# Patient Record
Sex: FEMALE | Race: BLACK OR AFRICAN AMERICAN | NOT HISPANIC OR LATINO | Employment: FULL TIME | RURAL
[De-identification: names, ages, dates, MRNs, and addresses within clinical notes are randomized per-mention and may not be internally consistent; named-entity substitution may affect disease eponyms.]

---

## 2017-12-08 ENCOUNTER — HISTORICAL (OUTPATIENT)
Dept: ADMINISTRATIVE | Facility: HOSPITAL | Age: 47
End: 2017-12-08

## 2017-12-14 LAB
LAB AP CLINICAL INFORMATION: NORMAL
LAB AP COMMENTS: NORMAL
LAB AP GENERAL CAT - HISTORICAL: NORMAL
LAB AP INTERPRETATION/RESULT - HISTORICAL: NEGATIVE
LAB AP SPECIMEN ADEQUACY - HISTORICAL: NORMAL
LAB AP SPECIMEN SUBMITTED - HISTORICAL: NORMAL

## 2021-03-09 ENCOUNTER — HISTORICAL (OUTPATIENT)
Dept: ADMINISTRATIVE | Facility: HOSPITAL | Age: 51
End: 2021-03-09

## 2021-08-09 ENCOUNTER — OFFICE VISIT (OUTPATIENT)
Dept: FAMILY MEDICINE | Facility: CLINIC | Age: 51
End: 2021-08-09
Payer: COMMERCIAL

## 2021-08-09 VITALS
HEIGHT: 67 IN | OXYGEN SATURATION: 99 % | BODY MASS INDEX: 24.45 KG/M2 | DIASTOLIC BLOOD PRESSURE: 68 MMHG | TEMPERATURE: 98 F | WEIGHT: 155.81 LBS | SYSTOLIC BLOOD PRESSURE: 113 MMHG | HEART RATE: 84 BPM

## 2021-08-09 DIAGNOSIS — M25.512 ACUTE PAIN OF LEFT SHOULDER: ICD-10-CM

## 2021-08-09 DIAGNOSIS — M79.671 FOOT PAIN, RIGHT: ICD-10-CM

## 2021-08-09 DIAGNOSIS — E03.9 HYPOTHYROIDISM, UNSPECIFIED TYPE: Primary | ICD-10-CM

## 2021-08-09 PROCEDURE — 85025 COMPLETE CBC W/AUTO DIFF WBC: CPT | Mod: ,,, | Performed by: CLINICAL MEDICAL LABORATORY

## 2021-08-09 PROCEDURE — 84436 ASSAY OF TOTAL THYROXINE: CPT | Mod: ,,, | Performed by: CLINICAL MEDICAL LABORATORY

## 2021-08-09 PROCEDURE — 80061 LIPID PANEL: ICD-10-PCS | Mod: ,,, | Performed by: CLINICAL MEDICAL LABORATORY

## 2021-08-09 PROCEDURE — 84443 ASSAY THYROID STIM HORMONE: CPT | Mod: ,,, | Performed by: CLINICAL MEDICAL LABORATORY

## 2021-08-09 PROCEDURE — 84436 T4: ICD-10-PCS | Mod: ,,, | Performed by: CLINICAL MEDICAL LABORATORY

## 2021-08-09 PROCEDURE — 80053 COMPREHENSIVE METABOLIC PANEL: ICD-10-PCS | Mod: ,,, | Performed by: CLINICAL MEDICAL LABORATORY

## 2021-08-09 PROCEDURE — 99213 PR OFFICE/OUTPT VISIT, EST, LEVL III, 20-29 MIN: ICD-10-PCS | Mod: ,,, | Performed by: NURSE PRACTITIONER

## 2021-08-09 PROCEDURE — 80061 LIPID PANEL: CPT | Mod: ,,, | Performed by: CLINICAL MEDICAL LABORATORY

## 2021-08-09 PROCEDURE — 85025 CBC WITH DIFFERENTIAL: ICD-10-PCS | Mod: ,,, | Performed by: CLINICAL MEDICAL LABORATORY

## 2021-08-09 PROCEDURE — 80053 COMPREHEN METABOLIC PANEL: CPT | Mod: ,,, | Performed by: CLINICAL MEDICAL LABORATORY

## 2021-08-09 PROCEDURE — 99213 OFFICE O/P EST LOW 20 MIN: CPT | Mod: ,,, | Performed by: NURSE PRACTITIONER

## 2021-08-09 PROCEDURE — 84443 TSH: ICD-10-PCS | Mod: ,,, | Performed by: CLINICAL MEDICAL LABORATORY

## 2021-08-09 RX ORDER — LEVOTHYROXINE SODIUM 75 UG/1
75 TABLET ORAL EVERY MORNING
Qty: 90 TABLET | Refills: 1 | Status: SHIPPED | OUTPATIENT
Start: 2021-08-09 | End: 2021-10-12 | Stop reason: SDUPTHER

## 2021-08-09 RX ORDER — LEVOTHYROXINE SODIUM 75 UG/1
1 TABLET ORAL EVERY MORNING
COMMUNITY
End: 2021-08-09 | Stop reason: SDUPTHER

## 2021-08-09 RX ORDER — GABAPENTIN 100 MG/1
1 CAPSULE ORAL 2 TIMES DAILY
COMMUNITY
Start: 2021-03-31 | End: 2021-08-09 | Stop reason: SDUPTHER

## 2021-08-09 RX ORDER — GABAPENTIN 100 MG/1
100 CAPSULE ORAL 3 TIMES DAILY
Qty: 180 CAPSULE | Refills: 1 | Status: SHIPPED | OUTPATIENT
Start: 2021-08-09 | End: 2021-10-12 | Stop reason: SDUPTHER

## 2021-08-10 LAB
ALBUMIN SERPL BCP-MCNC: 3.6 G/DL (ref 3.5–5)
ALBUMIN/GLOB SERPL: 0.9 {RATIO}
ALP SERPL-CCNC: 49 U/L (ref 39–100)
ALT SERPL W P-5'-P-CCNC: 21 U/L (ref 13–56)
ANION GAP SERPL CALCULATED.3IONS-SCNC: 8 MMOL/L (ref 7–16)
AST SERPL W P-5'-P-CCNC: 16 U/L (ref 15–37)
BASOPHILS # BLD AUTO: 0.06 K/UL (ref 0–0.2)
BASOPHILS NFR BLD AUTO: 0.8 % (ref 0–1)
BILIRUB SERPL-MCNC: 0.5 MG/DL (ref 0–1.2)
BUN SERPL-MCNC: 14 MG/DL (ref 7–18)
BUN/CREAT SERPL: 15 (ref 6–20)
CALCIUM SERPL-MCNC: 8.6 MG/DL (ref 8.5–10.1)
CHLORIDE SERPL-SCNC: 106 MMOL/L (ref 98–107)
CHOLEST SERPL-MCNC: 174 MG/DL (ref 0–200)
CHOLEST/HDLC SERPL: 2.5 {RATIO}
CO2 SERPL-SCNC: 31 MMOL/L (ref 21–32)
CREAT SERPL-MCNC: 0.96 MG/DL (ref 0.55–1.02)
DIFFERENTIAL METHOD BLD: ABNORMAL
EOSINOPHIL # BLD AUTO: 0.3 K/UL (ref 0–0.5)
EOSINOPHIL NFR BLD AUTO: 4.1 % (ref 1–4)
ERYTHROCYTE [DISTWIDTH] IN BLOOD BY AUTOMATED COUNT: 12.3 % (ref 11.5–14.5)
GLOBULIN SER-MCNC: 4.2 G/DL (ref 2–4)
GLUCOSE SERPL-MCNC: 50 MG/DL (ref 74–106)
HCT VFR BLD AUTO: 40.7 % (ref 38–47)
HDLC SERPL-MCNC: 70 MG/DL (ref 40–60)
HGB BLD-MCNC: 13.2 G/DL (ref 12–16)
IMM GRANULOCYTES # BLD AUTO: 0.02 K/UL (ref 0–0.04)
IMM GRANULOCYTES NFR BLD: 0.3 % (ref 0–0.4)
LDLC SERPL CALC-MCNC: 92 MG/DL
LDLC/HDLC SERPL: 1.3 {RATIO}
LYMPHOCYTES # BLD AUTO: 2.03 K/UL (ref 1–4.8)
LYMPHOCYTES NFR BLD AUTO: 27.5 % (ref 27–41)
MCH RBC QN AUTO: 30.8 PG (ref 27–31)
MCHC RBC AUTO-ENTMCNC: 32.4 G/DL (ref 32–36)
MCV RBC AUTO: 95.1 FL (ref 80–96)
MONOCYTES # BLD AUTO: 0.55 K/UL (ref 0–0.8)
MONOCYTES NFR BLD AUTO: 7.5 % (ref 2–6)
MPC BLD CALC-MCNC: 11.3 FL (ref 9.4–12.4)
NEUTROPHILS # BLD AUTO: 4.41 K/UL (ref 1.8–7.7)
NEUTROPHILS NFR BLD AUTO: 59.8 % (ref 53–65)
NONHDLC SERPL-MCNC: 104 MG/DL
NRBC # BLD AUTO: 0 X10E3/UL
NRBC, AUTO (.00): 0 %
PLATELET # BLD AUTO: 216 K/UL (ref 150–400)
POTASSIUM SERPL-SCNC: 4.1 MMOL/L (ref 3.5–5.1)
PROT SERPL-MCNC: 7.8 G/DL (ref 6.4–8.2)
RBC # BLD AUTO: 4.28 M/UL (ref 4.2–5.4)
SODIUM SERPL-SCNC: 141 MMOL/L (ref 136–145)
T4 SERPL-MCNC: 7.4 ΜG/DL (ref 4.8–13.9)
TRIGL SERPL-MCNC: 58 MG/DL (ref 35–150)
TSH SERPL DL<=0.005 MIU/L-ACNC: 1.52 UIU/ML (ref 0.36–3.74)
VLDLC SERPL-MCNC: 12 MG/DL
WBC # BLD AUTO: 7.37 K/UL (ref 4.5–11)

## 2021-08-12 ENCOUNTER — TELEPHONE (OUTPATIENT)
Dept: FAMILY MEDICINE | Facility: CLINIC | Age: 51
End: 2021-08-12

## 2021-09-27 DIAGNOSIS — M25.512 LEFT SHOULDER PAIN: Primary | ICD-10-CM

## 2021-10-12 ENCOUNTER — OFFICE VISIT (OUTPATIENT)
Dept: FAMILY MEDICINE | Facility: CLINIC | Age: 51
End: 2021-10-12
Payer: COMMERCIAL

## 2021-10-12 VITALS
OXYGEN SATURATION: 98 % | SYSTOLIC BLOOD PRESSURE: 117 MMHG | BODY MASS INDEX: 25.4 KG/M2 | HEIGHT: 67 IN | WEIGHT: 161.81 LBS | HEART RATE: 60 BPM | TEMPERATURE: 98 F | DIASTOLIC BLOOD PRESSURE: 72 MMHG | RESPIRATION RATE: 18 BRPM

## 2021-10-12 DIAGNOSIS — M25.512 CHRONIC LEFT SHOULDER PAIN: ICD-10-CM

## 2021-10-12 DIAGNOSIS — R41.3 MEMORY LOSS OF UNKNOWN CAUSE: Primary | ICD-10-CM

## 2021-10-12 DIAGNOSIS — R51.9 NONINTRACTABLE EPISODIC HEADACHE, UNSPECIFIED HEADACHE TYPE: ICD-10-CM

## 2021-10-12 DIAGNOSIS — Z12.31 SCREENING MAMMOGRAM FOR BREAST CANCER: ICD-10-CM

## 2021-10-12 DIAGNOSIS — M25.571 CHRONIC PAIN OF RIGHT ANKLE: ICD-10-CM

## 2021-10-12 DIAGNOSIS — G89.29 CHRONIC LEFT SHOULDER PAIN: ICD-10-CM

## 2021-10-12 DIAGNOSIS — G89.29 CHRONIC PAIN OF RIGHT ANKLE: ICD-10-CM

## 2021-10-12 PROCEDURE — 99214 PR OFFICE/OUTPT VISIT, EST, LEVL IV, 30-39 MIN: ICD-10-PCS | Mod: ,,, | Performed by: NURSE PRACTITIONER

## 2021-10-12 PROCEDURE — 99214 OFFICE O/P EST MOD 30 MIN: CPT | Mod: ,,, | Performed by: NURSE PRACTITIONER

## 2021-10-12 RX ORDER — MELOXICAM 15 MG/1
15 TABLET ORAL DAILY
Qty: 30 TABLET | Refills: 1 | Status: SHIPPED | OUTPATIENT
Start: 2021-10-12 | End: 2021-11-11

## 2021-10-12 RX ORDER — LEVOTHYROXINE SODIUM 75 UG/1
75 TABLET ORAL EVERY MORNING
Qty: 90 TABLET | Refills: 1 | Status: SHIPPED | OUTPATIENT
Start: 2021-10-12 | End: 2022-05-04 | Stop reason: SDUPTHER

## 2021-10-12 RX ORDER — GABAPENTIN 100 MG/1
300 CAPSULE ORAL 2 TIMES DAILY
Qty: 180 CAPSULE | Refills: 1 | Status: SHIPPED | OUTPATIENT
Start: 2021-10-12 | End: 2022-08-24

## 2021-10-22 ENCOUNTER — HOSPITAL ENCOUNTER (OUTPATIENT)
Dept: RADIOLOGY | Facility: HOSPITAL | Age: 51
Discharge: HOME | End: 2021-10-22
Attending: NURSE PRACTITIONER
Payer: COMMERCIAL

## 2021-10-22 DIAGNOSIS — R51.9 NONINTRACTABLE EPISODIC HEADACHE, UNSPECIFIED HEADACHE TYPE: ICD-10-CM

## 2021-10-22 PROCEDURE — 25500020 PHARM REV CODE 255: Performed by: NURSE PRACTITIONER

## 2021-10-22 PROCEDURE — 70553 MRI BRAIN STEM W/O & W/DYE: CPT | Mod: TC

## 2021-10-22 RX ADMIN — GADOTERATE MEGLUMINE 15 ML: 376.9 INJECTION INTRAVENOUS at 08:10

## 2022-02-24 ENCOUNTER — OFFICE VISIT (OUTPATIENT)
Dept: FAMILY MEDICINE | Facility: CLINIC | Age: 52
End: 2022-02-24
Payer: COMMERCIAL

## 2022-02-24 VITALS
SYSTOLIC BLOOD PRESSURE: 122 MMHG | HEIGHT: 67 IN | WEIGHT: 162.63 LBS | DIASTOLIC BLOOD PRESSURE: 72 MMHG | OXYGEN SATURATION: 100 % | HEART RATE: 73 BPM | BODY MASS INDEX: 25.53 KG/M2 | TEMPERATURE: 98 F

## 2022-02-24 DIAGNOSIS — R31.9 URINARY TRACT INFECTION WITH HEMATURIA, SITE UNSPECIFIED: Primary | ICD-10-CM

## 2022-02-24 DIAGNOSIS — N39.0 URINARY TRACT INFECTION WITH HEMATURIA, SITE UNSPECIFIED: Primary | ICD-10-CM

## 2022-02-24 DIAGNOSIS — R30.0 DYSURIA: ICD-10-CM

## 2022-02-24 DIAGNOSIS — N89.8 VAGINAL DISCHARGE: ICD-10-CM

## 2022-02-24 PROBLEM — F41.1 GENERALIZED ANXIETY DISORDER: Status: ACTIVE | Noted: 2022-02-24

## 2022-02-24 PROBLEM — E03.9 HYPOTHYROIDISM: Status: ACTIVE | Noted: 2022-02-24

## 2022-02-24 PROBLEM — R53.81 MALAISE AND FATIGUE: Status: ACTIVE | Noted: 2022-02-24

## 2022-02-24 PROBLEM — E78.5 HYPERLIPIDEMIA: Status: ACTIVE | Noted: 2022-02-24

## 2022-02-24 PROBLEM — R53.83 MALAISE AND FATIGUE: Status: ACTIVE | Noted: 2022-02-24

## 2022-02-24 LAB
BILIRUB SERPL-MCNC: ABNORMAL MG/DL
BLOOD URINE, POC: ABNORMAL
COLOR, POC UA: YELLOW
GLUCOSE UR QL STRIP: ABNORMAL
KETONES UR QL STRIP: ABNORMAL
LEUKOCYTE ESTERASE URINE, POC: ABNORMAL
NITRITE, POC UA: ABNORMAL
PH, POC UA: 7
PROTEIN, POC: ABNORMAL
SPECIFIC GRAVITY, POC UA: 1.02
UROBILINOGEN, POC UA: 1

## 2022-02-24 PROCEDURE — 99213 PR OFFICE/OUTPT VISIT, EST, LEVL III, 20-29 MIN: ICD-10-PCS | Mod: ,,, | Performed by: REGISTERED NURSE

## 2022-02-24 PROCEDURE — 81003 URINALYSIS AUTO W/O SCOPE: CPT | Mod: QW,,, | Performed by: REGISTERED NURSE

## 2022-02-24 PROCEDURE — 99213 OFFICE O/P EST LOW 20 MIN: CPT | Mod: ,,, | Performed by: REGISTERED NURSE

## 2022-02-24 PROCEDURE — 81003 POCT URINALYSIS W/O SCOPE: ICD-10-PCS | Mod: QW,,, | Performed by: REGISTERED NURSE

## 2022-02-24 RX ORDER — VENLAFAXINE HYDROCHLORIDE 75 MG/1
75 CAPSULE, EXTENDED RELEASE ORAL
COMMUNITY
Start: 2022-01-18 | End: 2022-08-24 | Stop reason: SDUPTHER

## 2022-02-24 RX ORDER — IRON,CARB/VIT C/VIT B12/FOLIC 100-250-1
TABLET ORAL
COMMUNITY
End: 2022-08-24 | Stop reason: SDUPTHER

## 2022-02-24 RX ORDER — FLUCONAZOLE 150 MG/1
TABLET ORAL
Qty: 2 TABLET | Refills: 0 | Status: SHIPPED | OUTPATIENT
Start: 2022-02-24 | End: 2022-08-24 | Stop reason: ALTCHOICE

## 2022-02-24 RX ORDER — CIPROFLOXACIN 500 MG/1
500 TABLET ORAL EVERY 12 HOURS
Qty: 10 TABLET | Refills: 0 | Status: SHIPPED | OUTPATIENT
Start: 2022-02-24 | End: 2022-03-01

## 2022-02-24 RX ORDER — IBUPROFEN 800 MG/1
800 TABLET ORAL
Qty: 20 TABLET | Refills: 0 | Status: SHIPPED | OUTPATIENT
Start: 2022-02-24 | End: 2022-03-03

## 2022-02-24 NOTE — PROGRESS NOTES
EB Gonzalez        PATIENT NAME: Maggie Llamas  : 1970  DATE: 22  MRN: [id number]      Billing Provider: EB Gonzalez  Level of Service: [place] OFFICE/OUTPT VISIT, EST, LEVL III, 20-29 MIN  Patient PCP Information     Provider PCP Type    [facility]          Reason for Visit / Chief Complaint: Urinary Tract Infection (Or yeast infection/Discharge w/ odor/X 2 weeks )       Update PCP  Update Chief Complaint         History of Present Illness / Problem Focused Workflow      HPI 53 y/o AAF here today with complaints of dysuria, vaginal itching, and thick, white vaginal discharge. Patient admits she takes at least 2 baths per day and reports use of Summer's [employer]gen douche at least 2 times per week. She denies fish like odor.     Review of Systems     Review of Systems   Constitutional: Negative.    HENT: Negative.    Respiratory: Negative.    Cardiovascular: Negative.    Gastrointestinal: Negative.    Genitourinary: Positive for dysuria, urgency and vaginal discharge.        Vaginal itching   Integumentary:  Negative.   Neurological: Negative.    Psychiatric/Behavioral: Negative.         Medical / Social / Family History     Past Medical History:   Diagnosis Date    Hyperthyroidism        Past Surgical History:   Procedure Laterality Date    HYSTERECTOMY      THYROID SURGERY         Social History  Ms. Maggie Llamas  reports that she has never smoked. She has never used smokeless tobacco. She reports that she does not drink alcohol and does not use drugs.    Family History  Ms. Maggie Llamas's family history is not on file.    Medications and Allergies     Medications  Outpatient Medications Marked as Taking for the 22 encounter (Office Visit) with EB Gonzalez   Medication Sig Dispense Refill    iron-vit c-b12-folic acid (ICAR-C PLUS) Tab 1 tablet      venlafaxine (EFFEXOR-XR) 75 MG 24 hr capsule Take 75 mg by mouth.         Allergies  Review of patient's  allergies indicates:  No Known Allergies    Physical Examination     Vitals:    02/24/22 1617   BP: 122/72   Pulse: 73   Temp: 97.8 °F (36.6 °C)     Physical Exam  Vitals and nursing note reviewed.   Constitutional:       Appearance: Normal appearance.   HENT:      Head: Normocephalic and atraumatic.   Cardiovascular:      Rate and Rhythm: Normal rate and regular rhythm.      Heart sounds: Normal heart sounds.   Pulmonary:      Effort: Pulmonary effort is normal.      Breath sounds: Normal breath sounds.   Musculoskeletal:      Cervical back: Normal range of motion.   Skin:     General: Skin is warm and dry.   Neurological:      Mental Status: She is alert and oriented to person, place, and time.   Psychiatric:         Behavior: Behavior normal.          Assessment and Plan (including Health Maintenance)      Problem List  Smart Sets  Document Outside HM   :    Plan:   Urinary tract infection with hematuria, site unspecified  -     ciprofloxacin HCl (CIPRO) 500 MG tablet; Take 1 tablet (500 mg total) by mouth every 12 (twelve) hours. for 5 days  Dispense: 10 tablet; Refill: 0    Dysuria  -     POCT URINALYSIS W/O SCOPE    Vaginal discharge  -     fluconazole (DIFLUCAN) 150 MG Tab; Take 1 tablet today and repeat dose on 02/27/2022.  Dispense: 2 tablet; Refill: 0    Other orders  -     ibuprofen (ADVIL,MOTRIN) 800 MG tablet; Take 1 tablet (800 mg total) by mouth every meal as needed for Pain.  Dispense: 20 tablet; Refill: 0           Health Maintenance Due   Topic Date Due    Hepatitis C Screening  Never done    COVID-19 Vaccine (1) Never done    HIV Screening  Never done    TETANUS VACCINE  Never done    Mammogram  Never done    Colorectal Cancer Screening  Never done    Influenza Vaccine (1) 09/01/2021    Shingles Vaccine (1 of 2) Never done       Problem List Items Addressed This Visit    None     Visit Diagnoses     Urinary tract infection with hematuria, site unspecified    -  Primary    Relevant  Medications    ciprofloxacin HCl (CIPRO) 500 MG tablet    Dysuria        Relevant Orders    POCT URINALYSIS W/O SCOPE (Completed)    Vaginal discharge        Relevant Medications    fluconazole (DIFLUCAN) 150 MG Tab          Health Maintenance Topics with due status: Not Due       Topic Last Completion Date    Lipid Panel 08/09/2021       No future appointments.     Patient Instructions   Increase water intake and avoid caffeine products like coffee and soda until symptoms resolve. Take all doses of antibiotic therapy as prescribed. Do not stop taking medications just because symptoms improve.     Ways to prevent yeast infections:  Wear breathable underwear. Cotton is your best choice. It doesnt hold onto heat or moisture. It will help keep you dry.  Keep things loose. Make sure your jeans, skirts, underwear, yoga pants, tights, pantyhose, etc. arent too snug. They can boost your body temperature and increase the amount of moisture around your private parts. That raises your chances for a yeast infection.  Dont douche. Feminine hygiene products like douches can disrupt the balance of bacteria in your vagina by removing some of the good bacteria thats supposed to be there to fight off infections. And skip the scent in feminine products. This includes bubble baths, soaps, sprays, tampons, and pads.  Avoid hot tubs and extra hot baths. Hot and damp are never your friends.  Change out of wet clothes. Don't sit in a wet bathing suit after you go swimming or damp workout gear after the gym. Change into dry clothes right away.  In the bathroom, always wipe from front to back.  When on your period, change your tampons, pads, and panty liners often.  Manage your diabetes. If you have it, be sure to keep an eye on your blood sugar levels and keep them under control.  Use antibiotics only when you have to. You dont need them for conditions like a cold, because they dont do anything against a virus. If you don't have  to, don't take them.  Eat yogurt that contains active cultures to add to the good bacteria that help your body control yeast.    No follow-ups on file.     Signature:  EB Gonzalez      Date of encounter: 2/24/22

## 2022-02-28 NOTE — PATIENT INSTRUCTIONS
Increase water intake and avoid caffeine products like coffee and soda until symptoms resolve. Take all doses of antibiotic therapy as prescribed. Do not stop taking medications just because symptoms improve.     Ways to prevent yeast infections:  Wear breathable underwear. Cotton is your best choice. It doesnt hold onto heat or moisture. It will help keep you dry.  Keep things loose. Make sure your jeans, skirts, underwear, yoga pants, tights, pantyhose, etc. arent too snug. They can boost your body temperature and increase the amount of moisture around your private parts. That raises your chances for a yeast infection.  Dont douche. Feminine hygiene products like douches can disrupt the balance of bacteria in your vagina by removing some of the good bacteria thats supposed to be there to fight off infections. And skip the scent in feminine products. This includes bubble baths, soaps, sprays, tampons, and pads.  Avoid hot tubs and extra hot baths. Hot and damp are never your friends.  Change out of wet clothes. Don't sit in a wet bathing suit after you go swimming or damp workout gear after the gym. Change into dry clothes right away.  In the bathroom, always wipe from front to back.  When on your period, change your tampons, pads, and panty liners often.  Manage your diabetes. If you have it, be sure to keep an eye on your blood sugar levels and keep them under control.  Use antibiotics only when you have to. You dont need them for conditions like a cold, because they dont do anything against a virus. If you don't have to, don't take them.  Eat yogurt that contains active cultures to add to the good bacteria that help your body control yeast.

## 2022-05-04 DIAGNOSIS — E07.9 THYROID DISORDER: Primary | ICD-10-CM

## 2022-05-04 RX ORDER — LEVOTHYROXINE SODIUM 75 UG/1
75 TABLET ORAL EVERY MORNING
Qty: 90 TABLET | Refills: 0 | Status: SHIPPED | OUTPATIENT
Start: 2022-05-04 | End: 2022-08-18 | Stop reason: SDUPTHER

## 2022-05-13 ENCOUNTER — OFFICE VISIT (OUTPATIENT)
Dept: FAMILY MEDICINE | Facility: CLINIC | Age: 52
End: 2022-05-13
Payer: COMMERCIAL

## 2022-05-13 DIAGNOSIS — E03.9 HYPOTHYROIDISM, UNSPECIFIED TYPE: ICD-10-CM

## 2022-05-13 DIAGNOSIS — Z00.00 ENCOUNTER FOR WELLNESS EXAMINATION IN ADULT: Primary | ICD-10-CM

## 2022-05-13 DIAGNOSIS — M25.571 PAIN IN JOINT INVOLVING RIGHT ANKLE AND FOOT: ICD-10-CM

## 2022-05-13 DIAGNOSIS — H66.90 OTITIS MEDIA, UNSPECIFIED LATERALITY, UNSPECIFIED OTITIS MEDIA TYPE: ICD-10-CM

## 2022-05-13 LAB
CHOLEST SERPL-MCNC: 184 MG/DL (ref 0–200)
CHOLEST/HDLC SERPL: 2.6 {RATIO}
GLUCOSE SERPL-MCNC: 82 MG/DL (ref 74–106)
HDLC SERPL-MCNC: 71 MG/DL (ref 40–60)
LDLC SERPL CALC-MCNC: 102 MG/DL
LDLC/HDLC SERPL: 1.4 {RATIO}
NONHDLC SERPL-MCNC: 113 MG/DL
T4 SERPL-MCNC: 9.2 ΜG/DL (ref 4.8–13.9)
TRIGL SERPL-MCNC: 57 MG/DL (ref 35–150)
TSH SERPL DL<=0.005 MIU/L-ACNC: 0.97 UIU/ML (ref 0.36–3.74)
VLDLC SERPL-MCNC: 11 MG/DL

## 2022-05-13 PROCEDURE — 80061 LIPID PANEL: CPT | Mod: ,,, | Performed by: CLINICAL MEDICAL LABORATORY

## 2022-05-13 PROCEDURE — 84436 T4: ICD-10-PCS | Mod: ,,, | Performed by: CLINICAL MEDICAL LABORATORY

## 2022-05-13 PROCEDURE — 99396 PREV VISIT EST AGE 40-64: CPT | Mod: ,,, | Performed by: NURSE PRACTITIONER

## 2022-05-13 PROCEDURE — 82947 GLUCOSE, RANDOM: ICD-10-PCS | Mod: ,,, | Performed by: CLINICAL MEDICAL LABORATORY

## 2022-05-13 PROCEDURE — 99396 PR PREVENTIVE VISIT,EST,40-64: ICD-10-PCS | Mod: ,,, | Performed by: NURSE PRACTITIONER

## 2022-05-13 PROCEDURE — 84443 ASSAY THYROID STIM HORMONE: CPT | Mod: ,,, | Performed by: CLINICAL MEDICAL LABORATORY

## 2022-05-13 PROCEDURE — 80061 LIPID PANEL: ICD-10-PCS | Mod: ,,, | Performed by: CLINICAL MEDICAL LABORATORY

## 2022-05-13 PROCEDURE — 84443 TSH: ICD-10-PCS | Mod: ,,, | Performed by: CLINICAL MEDICAL LABORATORY

## 2022-05-13 PROCEDURE — 84436 ASSAY OF TOTAL THYROXINE: CPT | Mod: ,,, | Performed by: CLINICAL MEDICAL LABORATORY

## 2022-05-13 PROCEDURE — 82947 ASSAY GLUCOSE BLOOD QUANT: CPT | Mod: ,,, | Performed by: CLINICAL MEDICAL LABORATORY

## 2022-05-13 RX ORDER — MELOXICAM 15 MG/1
15 TABLET ORAL DAILY
Qty: 30 TABLET | Refills: 2 | Status: SHIPPED | OUTPATIENT
Start: 2022-05-13 | End: 2022-08-24 | Stop reason: SDUPTHER

## 2022-08-18 DIAGNOSIS — E07.9 THYROID DISORDER: ICD-10-CM

## 2022-08-22 RX ORDER — LEVOTHYROXINE SODIUM 75 UG/1
75 TABLET ORAL EVERY MORNING
Qty: 30 TABLET | Refills: 0 | Status: SHIPPED | OUTPATIENT
Start: 2022-08-22 | End: 2022-08-24 | Stop reason: SDUPTHER

## 2022-08-24 ENCOUNTER — OFFICE VISIT (OUTPATIENT)
Dept: FAMILY MEDICINE | Facility: CLINIC | Age: 52
End: 2022-08-24
Payer: COMMERCIAL

## 2022-08-24 VITALS
HEART RATE: 73 BPM | TEMPERATURE: 98 F | OXYGEN SATURATION: 97 % | WEIGHT: 166.38 LBS | DIASTOLIC BLOOD PRESSURE: 69 MMHG | HEIGHT: 67 IN | BODY MASS INDEX: 26.11 KG/M2 | SYSTOLIC BLOOD PRESSURE: 98 MMHG

## 2022-08-24 DIAGNOSIS — R53.83 MALAISE AND FATIGUE: ICD-10-CM

## 2022-08-24 DIAGNOSIS — R53.81 MALAISE AND FATIGUE: ICD-10-CM

## 2022-08-24 DIAGNOSIS — F41.1 GENERALIZED ANXIETY DISORDER: ICD-10-CM

## 2022-08-24 DIAGNOSIS — Z12.31 SCREENING MAMMOGRAM FOR BREAST CANCER: Primary | ICD-10-CM

## 2022-08-24 DIAGNOSIS — E07.9 THYROID DISORDER: Primary | ICD-10-CM

## 2022-08-24 DIAGNOSIS — R10.9 RIGHT FLANK PAIN: ICD-10-CM

## 2022-08-24 DIAGNOSIS — M25.571 PAIN IN JOINT INVOLVING RIGHT ANKLE AND FOOT: ICD-10-CM

## 2022-08-24 LAB
BILIRUB SERPL-MCNC: NEGATIVE MG/DL
BLOOD URINE, POC: ABNORMAL
COLOR, POC UA: YELLOW
GLUCOSE UR QL STRIP: NEGATIVE
KETONES UR QL STRIP: NEGATIVE
LEUKOCYTE ESTERASE URINE, POC: ABNORMAL
NITRITE, POC UA: NEGATIVE
PH, POC UA: 7.5
PROTEIN, POC: ABNORMAL
SPECIFIC GRAVITY, POC UA: 1.02
UROBILINOGEN, POC UA: 2

## 2022-08-24 PROCEDURE — 99213 OFFICE O/P EST LOW 20 MIN: CPT | Mod: ,,, | Performed by: REGISTERED NURSE

## 2022-08-24 PROCEDURE — 81001 URINALYSIS AUTO W/SCOPE: CPT | Mod: ,,, | Performed by: CLINICAL MEDICAL LABORATORY

## 2022-08-24 PROCEDURE — 87086 CULTURE, URINE: ICD-10-PCS | Mod: ,,, | Performed by: CLINICAL MEDICAL LABORATORY

## 2022-08-24 PROCEDURE — 87086 URINE CULTURE/COLONY COUNT: CPT | Mod: ,,, | Performed by: CLINICAL MEDICAL LABORATORY

## 2022-08-24 PROCEDURE — 99213 PR OFFICE/OUTPT VISIT, EST, LEVL III, 20-29 MIN: ICD-10-PCS | Mod: ,,, | Performed by: REGISTERED NURSE

## 2022-08-24 PROCEDURE — 81003 URINALYSIS AUTO W/O SCOPE: CPT | Mod: QW,,, | Performed by: REGISTERED NURSE

## 2022-08-24 PROCEDURE — 81003 POCT URINALYSIS W/O SCOPE: ICD-10-PCS | Mod: QW,,, | Performed by: REGISTERED NURSE

## 2022-08-24 PROCEDURE — 81001 URINALYSIS, REFLEX TO URINE CULTURE: ICD-10-PCS | Mod: ,,, | Performed by: CLINICAL MEDICAL LABORATORY

## 2022-08-24 RX ORDER — VENLAFAXINE HYDROCHLORIDE 75 MG/1
75 CAPSULE, EXTENDED RELEASE ORAL DAILY
Qty: 30 CAPSULE | Refills: 3 | Status: SHIPPED | OUTPATIENT
Start: 2022-08-24 | End: 2022-12-22

## 2022-08-24 RX ORDER — MELOXICAM 15 MG/1
15 TABLET ORAL DAILY
Qty: 30 TABLET | Refills: 2 | Status: SHIPPED | OUTPATIENT
Start: 2022-08-24 | End: 2023-09-21 | Stop reason: CLARIF

## 2022-08-24 RX ORDER — PRIMIDONE 50 MG/1
50 TABLET ORAL
COMMUNITY
Start: 2022-05-24 | End: 2022-08-24 | Stop reason: SDUPTHER

## 2022-08-24 RX ORDER — PRIMIDONE 50 MG/1
50 TABLET ORAL NIGHTLY
Qty: 30 TABLET | Refills: 3 | Status: SHIPPED | OUTPATIENT
Start: 2022-08-24 | End: 2022-08-24

## 2022-08-24 RX ORDER — IRON,CARB/VIT C/VIT B12/FOLIC 100-250-1
TABLET ORAL
Qty: 30 TABLET | Refills: 0 | Status: SHIPPED | OUTPATIENT
Start: 2022-08-24

## 2022-08-24 RX ORDER — LEVOTHYROXINE SODIUM 75 UG/1
75 TABLET ORAL EVERY MORNING
Qty: 30 TABLET | Refills: 3 | Status: SHIPPED | OUTPATIENT
Start: 2022-08-24 | End: 2022-09-28 | Stop reason: SDUPTHER

## 2022-08-24 RX ORDER — PRIMIDONE 50 MG/1
50 TABLET ORAL NIGHTLY
Qty: 30 TABLET | Refills: 3
Start: 2022-08-24 | End: 2022-12-22

## 2022-08-24 RX ORDER — MELOXICAM 15 MG/1
15 TABLET ORAL DAILY
Qty: 30 TABLET | Refills: 2 | Status: SHIPPED | OUTPATIENT
Start: 2022-08-24 | End: 2022-08-24

## 2022-08-24 NOTE — PATIENT INSTRUCTIONS
Take all medications as prescribed. Do not stop or start any new medications without consulting with your provider.    We will call you with lab results and information for mammogram when appointment is scheduled.

## 2022-08-24 NOTE — PROGRESS NOTES
EB Gonzalez        PATIENT NAME: Maggie Llamas  : 1970  DATE: 22  MRN: [id number]      Billing Provider: EB Gonzalez  Level of Service: [place] OFFICE/OUTPT VISIT, EST, LEVL III, 20-29 MIN  Patient PCP Information     Provider PCP Type    EB Gonzalez [facility]          Reason for Visit / Chief Complaint: Medication Reaction (Medication refill; and may need blood work done foe thyroid; had a wellness last time but never received appointment for mammogram )       Update PCP  Update Chief Complaint         History of Present Illness / Problem Focused Workflow     HPI Mrs. Llamas is a 51 y/o AAF here for chronic disease follow-up and medication refills. She reports having right sided flank pain, states it only hurts when she is constipated, after having a good bowel movement, the pain subsides. No pain on left side, no abdominal pain. She is followed by Neurology, was seen in May 2022, records in Epic for review.     Review of Systems     Review of Systems   Constitutional: Negative.    HENT: Negative.    Respiratory: Negative.    Cardiovascular: Negative.    Gastrointestinal: Positive for constipation.   Genitourinary: Positive for flank pain.   Musculoskeletal: Positive for arthralgias, back pain, myalgias and neck stiffness.   Neurological: Positive for headaches and memory loss.   Psychiatric/Behavioral: Positive for confusion. The patient is nervous/anxious.       Medical / Social / Family History     Past Medical History:   Diagnosis Date    Hyperthyroidism        Past Surgical History:   Procedure Laterality Date    HYSTERECTOMY      THYROID SURGERY         Social History  Ms. Maggie Llamas  reports that she has never smoked. She has never used smokeless tobacco. She reports that she does not drink alcohol and does not use drugs.    Family History  Ms. Maggie Llamas's family history is not on file.    Medications and Allergies     Medications  Outpatient Medications Marked  as Taking for the 8/24/22 encounter (Office Visit) with EB Gonzalez   Medication Sig Dispense Refill    [DISCONTINUED] levothyroxine (SYNTHROID) 75 MCG tablet Take 1 tablet (75 mcg total) by mouth every morning. 30 tablet 0    [DISCONTINUED] meloxicam (MOBIC) 15 MG tablet Take 1 tablet (15 mg total) by mouth once daily. 30 tablet 2    [DISCONTINUED] primidone (MYSOLINE) 50 MG Tab Take 50 mg by mouth.       Allergies  Review of patient's allergies indicates:  No Known Allergies    Physical Examination     Vitals:    08/24/22 0841   BP: 98/69   Pulse: 73   Temp: 97.6 °F (36.4 °C)     Physical Exam  Vitals and nursing note reviewed.   Constitutional:       Appearance: Normal appearance.   HENT:      Head: Normocephalic and atraumatic.      Nose: Nose normal.   Cardiovascular:      Rate and Rhythm: Normal rate and regular rhythm.      Heart sounds: Normal heart sounds.   Pulmonary:      Effort: Pulmonary effort is normal.      Breath sounds: Normal breath sounds.   Abdominal:      General: Bowel sounds are normal. There is no distension.      Tenderness: There is no abdominal tenderness. There is no right CVA tenderness, left CVA tenderness or guarding.   Musculoskeletal:      Cervical back: Rigidity present. Pain with movement present. Decreased range of motion.      Comments: Low back pain and pain affecting shoulders and knees secondary to osteoarthritis.    Lymphadenopathy:      Cervical: No cervical adenopathy.   Skin:     General: Skin is warm and dry.   Neurological:      Mental Status: She is alert and oriented to person, place, and time.        Assessment and Plan (including Health Maintenance)      Problem List  Smart Sets  Document Outside HM   Plan:   Thyroid disorder  -     levothyroxine (SYNTHROID) 75 MCG tablet; Take 1 tablet (75 mcg total) by mouth every morning.  Dispense: 30 tablet; Refill: 3    Generalized anxiety disorder  -     Discontinue: primidone (MYSOLINE) 50 MG Tab; Take 1 tablet  (50 mg total) by mouth every evening.  Dispense: 30 tablet; Refill: 3  -     venlafaxine (EFFEXOR-XR) 75 MG 24 hr capsule; Take 1 capsule (75 mg total) by mouth once daily.  Dispense: 30 capsule; Refill: 3  -     primidone (MYSOLINE) 50 MG Tab; Take 1 tablet (50 mg total) by mouth every evening.  Dispense: 30 tablet; Refill: 3    Malaise and fatigue  -     iron-vit c-b12-folic acid (ICAR-C PLUS) Tab; 1 tablet  Dispense: 30 tablet; Refill: 0    Right flank pain  -     POCT URINALYSIS W/O SCOPE  -     Urinalysis, Reflex to Urine Culture; Future; Expected date: 08/24/2022    Pain in joint involving right ankle and foot  -     Discontinue: meloxicam (MOBIC) 15 MG tablet; Take 1 tablet (15 mg total) by mouth once daily.  Dispense: 30 tablet; Refill: 2  -     meloxicam (MOBIC) 15 MG tablet; Take 1 tablet (15 mg total) by mouth once daily.  Dispense: 30 tablet; Refill: 2       Health Maintenance Due   Topic Date Due    Hepatitis C Screening  Never done    COVID-19 Vaccine (1) Never done    HIV Screening  Never done    TETANUS VACCINE  Never done    Mammogram  Never done    Colorectal Cancer Screening  Never done    Shingles Vaccine (1 of 2) Never done       Problem List Items Addressed This Visit        Psychiatric    Generalized anxiety disorder    Relevant Medications    venlafaxine (EFFEXOR-XR) 75 MG 24 hr capsule    primidone (MYSOLINE) 50 MG Tab       Orthopedic    Pain in joint involving ankle and foot    Relevant Medications    meloxicam (MOBIC) 15 MG tablet       Other    Malaise and fatigue    Relevant Medications    iron-vit c-b12-folic acid (ICAR-C PLUS) Tab      Other Visit Diagnoses     Thyroid disorder    -  Primary    Relevant Medications    levothyroxine (SYNTHROID) 75 MCG tablet    Right flank pain        Relevant Orders    POCT URINALYSIS W/O SCOPE (Completed)    Urinalysis, Reflex to Urine Culture          Health Maintenance Topics with due status: Not Due       Topic Last Completion Date     Influenza Vaccine 10/14/2014    Lipid Panel 05/13/2022       Future Appointments   Date Time Provider Department Center   5/15/2023 11:00 AM EB Gonzalez [facility]        Patient Instructions   Take all medications as prescribed. Do not stop or start any new medications without consulting with your provider.    We will call you with lab results and information for mammogram when appointment is scheduled.     Follow up in about 4 months (around 12/24/2022).     Signature:  EB Gonzalez      Date of encounter: 8/24/22

## 2022-08-25 LAB
BILIRUB UR QL STRIP: NEGATIVE
CLARITY UR: CLEAR
COLOR UR: YELLOW
GLUCOSE UR STRIP-MCNC: NORMAL MG/DL
KETONES UR STRIP-SCNC: NEGATIVE MG/DL
LEUKOCYTE ESTERASE UR QL STRIP: ABNORMAL
MUCOUS, UA: ABNORMAL /LPF
NITRITE UR QL STRIP: NEGATIVE
PH UR STRIP: 7.5 PH UNITS
PROT UR QL STRIP: 10
RBC # UR STRIP: ABNORMAL /UL
RBC #/AREA URNS HPF: 19 /HPF
SP GR UR STRIP: 1.02
SQUAMOUS #/AREA URNS LPF: ABNORMAL /HPF
UROBILINOGEN UR STRIP-ACNC: 2 MG/DL
WBC #/AREA URNS HPF: 28 /HPF

## 2022-08-27 LAB — UA COMPLETE W REFLEX CULTURE PNL UR: NO GROWTH

## 2022-08-29 ENCOUNTER — TELEPHONE (OUTPATIENT)
Dept: FAMILY MEDICINE | Facility: CLINIC | Age: 52
End: 2022-08-29
Payer: COMMERCIAL

## 2022-08-29 NOTE — TELEPHONE ENCOUNTER
----- Message from EB Gonzalez sent at 8/26/2022  4:55 PM CDT -----  Please let Mrs. Serrano know she needs to make sure she is drinking plenty of water. The urine culture has come back and it does not show any bacterial growth.

## 2022-09-28 ENCOUNTER — OFFICE VISIT (OUTPATIENT)
Dept: FAMILY MEDICINE | Facility: CLINIC | Age: 52
End: 2022-09-28
Payer: COMMERCIAL

## 2022-09-28 VITALS
BODY MASS INDEX: 25.11 KG/M2 | WEIGHT: 160 LBS | TEMPERATURE: 97 F | DIASTOLIC BLOOD PRESSURE: 71 MMHG | SYSTOLIC BLOOD PRESSURE: 102 MMHG | HEART RATE: 69 BPM | OXYGEN SATURATION: 99 % | HEIGHT: 67 IN

## 2022-09-28 DIAGNOSIS — Z12.11 SCREENING FOR COLON CANCER: Primary | ICD-10-CM

## 2022-09-28 DIAGNOSIS — E07.9 THYROID DISORDER: ICD-10-CM

## 2022-09-28 DIAGNOSIS — F41.9 ANXIETY: ICD-10-CM

## 2022-09-28 PROCEDURE — 99215 PR OFFICE/OUTPT VISIT, EST, LEVL V, 40-54 MIN: ICD-10-PCS | Mod: ,,, | Performed by: STUDENT IN AN ORGANIZED HEALTH CARE EDUCATION/TRAINING PROGRAM

## 2022-09-28 PROCEDURE — 99215 OFFICE O/P EST HI 40 MIN: CPT | Mod: ,,, | Performed by: STUDENT IN AN ORGANIZED HEALTH CARE EDUCATION/TRAINING PROGRAM

## 2022-09-28 RX ORDER — LEVOTHYROXINE SODIUM 75 UG/1
75 TABLET ORAL EVERY MORNING
Qty: 30 TABLET | Refills: 3 | Status: SHIPPED | OUTPATIENT
Start: 2022-09-28 | End: 2022-10-12 | Stop reason: SDUPTHER

## 2022-09-28 RX ORDER — CITALOPRAM 10 MG/1
10 TABLET ORAL DAILY
Qty: 30 TABLET | Refills: 11 | Status: SHIPPED | OUTPATIENT
Start: 2022-09-28 | End: 2023-09-28

## 2022-09-28 NOTE — PROGRESS NOTES
Subjective:       Patient ID: Maggie Llamas is a 52 y.o. female.    Chief Complaint: Stress    HPI    Ms. Llamas is a 52-year-old woman with the medical problems listed below who is here due to concerns over stress and anxiety.When she gets around a lot of people she feels like her heart starts jumping. Happens both at [employer] and at work. She thinks it might be social anxiety. To some degree, she feels  as though it has always been this way, but for some unknown reason seems like it has been magnified.     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 Feels like finding that calmness and poise is becoming increasingly difficult at work. She works 12 hour shifts which are very demanding for her.    She also feels stressed and disappointed at home. States she is feeling the weight of taking care of her kids, working, managing her household. She has a 29, 27, and a 15-year-old.     She states that she does not have a lot of social support, and that the only person she can talk to is her ; they have been together for 31 years and she describes him as her best friend.     In addition to problems at home and work , her symptoms also bother at [employer] when she is around a lot of people. She does not derive a lot of social support from her [employer] community. Feels like she's just giving and giving, but nobody's giving back to her.      History of essential tremor on pharmacologic therapy; has an appointment with her neurologist this afternoon. Kimi Lockhart at [facility]    She denies any history of psychiatric disorders or prior use of SNRIs or SSRIs or mood stabilizers. She did not start the venlafaxine that was previously recommended last visit.     She does have a history of seeing a counselor previously, but she did not recall reaping a lot of benefit from this. Went to around 2 sessions. In [place] (can't remember the name or clinic.)    In the past, she has tried using the [employer] kelin and this was helpful.  "    Has been eating well - trying more of a plant-based diet.     She denies excessive caffeine intake. She takes zinc, magnesium, iron, and b complex as nutritional supplements. She does not use marijuana or any illicit substances.     Review of Systems   Gen: Denies fever/chills/weight loss  Head: Denies HA/lightheadedness  Eyes: Denies vision change, blurry vision, vision loss, diplopia  ENT: Denies tinnitus/rhinorrhea/congestion/throat pain  Resp: Denies SOB/cough/wheeze  CV: Denies CP/palpitations/edema  GI: Denies n/v/d/c/melena/hematochezia  : Denies dysuria/hematuria/frequency/incontinence  MSK: Denies muscle/joint pain  Neuro: Denies weakness/paralysis/numbness/tingling  Skin: Denies rash     Objective:    /71   Pulse 69   Temp 97.4 °F (36.3 °C) (Oral)   Ht 5' 7" (1.702 m)   Wt 72.6 kg (160 lb)   SpO2 99%   BMI 25.06 kg/m²     Physical Exam    Gen: NAD, well-groomed, well-dressed   HEENT: oropharynx benign; no conjunctival pallor or scleral icterus; mucous membranes moist  CV: normal rate, regular rhythm; no m/r/g appreciated  Pulm: CTAB; normal work of breathing  Abdomen: soft, non tender/non-distended  Extremities: warm, well-perfused; no peripheral edema  MSK: normal bulk and tone   Skin: warm and dry; no suspicious lesions  Neuro: CN II-XII grossly normal; no focal deficits; awake and alert; gait normal  Psych: pleasant affect; judgment/insight intact    Assessment / Plan:       Problem List Items Addressed This Visit          Psychiatric    Anxiety     52-year-old woman reporting symptoms anxiety and stress. Last TSH and T4 were within normal limits several months ago. Not having any new or persistent constitutional symptoms. PE unrevealing for medical causes of her symptoms. Not taking venlafaxine.  - Start citalopram low dose; titrate up in several weeks if tolerating well  - Discuss prn proponalol with neurologist today  - Refer to counselor  - Start using [employer] other things you bake kelin again  - " follow-up in 4 weeks to re-assess.          Relevant Medications    citalopram (CELEXA) 10 MG tablet    Other Relevant Orders    Ambulatory referral/consult to Psychology       Endocrine    Thyroid disorder    Relevant Medications    levothyroxine (SYNTHROID) 75 MCG tablet       GI    Screening for colon cancer - Primary    Relevant Orders    Ambulatory referral/consult to Gastroenterology         Face to face encounter time 30 minutes.    Non face to face encounter time 25 minutes.  Reviewing separate obtained history, counseling and educating the patient, family and/or other caregivers, ordering medications, tests or procedures; referring and communicating with other health care professionals; documenting clinical information in the electronic medical record; care coordination; independently interpreting results and communicating results to the patient, family, and/or caregivers.    Total time for visit  55 minutes

## 2022-09-28 NOTE — ASSESSMENT & PLAN NOTE
52-year-old woman reporting symptoms anxiety and stress. Last TSH and T4 were within normal limits several months ago. Not having any new or persistent constitutional symptoms. PE unrevealing for medical causes of her symptoms. Not taking venlafaxine.  - Start citalopram low dose; titrate up in several weeks if tolerating well  - Discuss prn proponalol with neurologist today  - Refer to counselor  - Start using [employer] kelin again  - follow-up in 4 weeks to re-assess.

## 2022-09-28 NOTE — PATIENT INSTRUCTIONS
- continue using your [employer] kelin  - talk to your neurologist about as needed propanolol before social events.

## 2022-10-12 ENCOUNTER — OFFICE VISIT (OUTPATIENT)
Dept: FAMILY MEDICINE | Facility: CLINIC | Age: 52
End: 2022-10-12
Payer: COMMERCIAL

## 2022-10-12 VITALS
TEMPERATURE: 98 F | WEIGHT: 163 LBS | SYSTOLIC BLOOD PRESSURE: 112 MMHG | OXYGEN SATURATION: 97 % | HEIGHT: 67 IN | DIASTOLIC BLOOD PRESSURE: 69 MMHG | HEART RATE: 67 BPM | BODY MASS INDEX: 25.58 KG/M2

## 2022-10-12 DIAGNOSIS — F41.9 ANXIETY: ICD-10-CM

## 2022-10-12 DIAGNOSIS — E03.9 HYPOTHYROIDISM, UNSPECIFIED TYPE: ICD-10-CM

## 2022-10-12 DIAGNOSIS — E07.9 THYROID DISORDER: Primary | ICD-10-CM

## 2022-10-12 LAB — TSH SERPL DL<=0.005 MIU/L-ACNC: 2.82 UIU/ML (ref 0.36–3.74)

## 2022-10-12 PROCEDURE — 99213 PR OFFICE/OUTPT VISIT, EST, LEVL III, 20-29 MIN: ICD-10-PCS | Mod: ,,, | Performed by: STUDENT IN AN ORGANIZED HEALTH CARE EDUCATION/TRAINING PROGRAM

## 2022-10-12 PROCEDURE — 84443 TSH: ICD-10-PCS | Mod: ,,, | Performed by: CLINICAL MEDICAL LABORATORY

## 2022-10-12 PROCEDURE — 84443 ASSAY THYROID STIM HORMONE: CPT | Mod: ,,, | Performed by: CLINICAL MEDICAL LABORATORY

## 2022-10-12 PROCEDURE — 99213 OFFICE O/P EST LOW 20 MIN: CPT | Mod: ,,, | Performed by: STUDENT IN AN ORGANIZED HEALTH CARE EDUCATION/TRAINING PROGRAM

## 2022-10-12 RX ORDER — LEVOTHYROXINE SODIUM 75 UG/1
75 TABLET ORAL EVERY MORNING
Qty: 30 TABLET | Refills: 3 | Status: SHIPPED | OUTPATIENT
Start: 2022-10-12 | End: 2022-10-12

## 2022-10-12 RX ORDER — LEVOTHYROXINE SODIUM 75 UG/1
75 TABLET ORAL EVERY MORNING
Qty: 30 TABLET | Refills: 3 | Status: SHIPPED | OUTPATIENT
Start: 2022-10-12 | End: 2023-03-10 | Stop reason: SDUPTHER

## 2022-10-12 NOTE — ASSESSMENT & PLAN NOTE
Her mood is stable and somewhat improved; she has also started citalopram, which she is tolerating without any ADR; continue 10 mg for now; titrate up next visit. F/u after establishing with a counselor at [place]

## 2022-10-12 NOTE — PROGRESS NOTES
"Subjective:       Patient ID: Maggie Llamas is a 52 y.o. female.    Chief Complaint: Medication Refill (Check thryoid)    HPI    52 year-old woman here for refill of levothyroxine.Has been out for several days but had been tolerating her previous dose and taking as prescribed       Her mood is better and stable and she has also started citalopram, which she is tolerating without any ADR    She has an appointment at [facility] on Monday.         Objective:    /69   Pulse 67   Temp 98.3 °F (36.8 °C)   Ht 5' 7" (1.702 m)   Wt 73.9 kg (163 lb)   SpO2 97%   BMI 25.53 kg/m²     Physical Exam    Gen: well-groomed, well-dressed. No apparent distress  HEENT:  NCAT, symmetric  Pulm: normal work of breathing, no accessory muscle use;  Neuro: CN II-XII grossly normal   Psych: pleasant affect, congruent mood. Linear thought; good eye contact  SKIN: no rashes present on face, neck, hands    Assessment / Plan:       Problem List Items Addressed This Visit          Psychiatric    Anxiety     Her mood is stable and somewhat improved; she has also started citalopram, which she is tolerating without any ADR; continue 10 mg for now; titrate up next visit. F/u after establishing with a counselor at [facility]            Endocrine    Hypothyroidism    Thyroid disorder - Primary     Recheck labs and refill meds; previously stable. Instructed patient on proper use.          Relevant Medications    levothyroxine (SYNTHROID) 75 MCG tablet    Other Relevant Orders    TSH          "

## 2022-10-17 ENCOUNTER — TELEPHONE (OUTPATIENT)
Dept: FAMILY MEDICINE | Facility: CLINIC | Age: 52
End: 2022-10-17
Payer: COMMERCIAL

## 2022-10-17 NOTE — TELEPHONE ENCOUNTER
----- Message from Akash Garcia MD sent at 10/14/2022 10:32 AM CDT -----  Please contact the patient and let them know that their results were fine and do not require any change in treatment.

## 2023-03-10 ENCOUNTER — OFFICE VISIT (OUTPATIENT)
Dept: FAMILY MEDICINE | Facility: CLINIC | Age: 53
End: 2023-03-10

## 2023-03-10 VITALS
HEIGHT: 66 IN | OXYGEN SATURATION: 96 % | RESPIRATION RATE: 18 BRPM | DIASTOLIC BLOOD PRESSURE: 62 MMHG | HEART RATE: 79 BPM | BODY MASS INDEX: 26.2 KG/M2 | WEIGHT: 163 LBS | TEMPERATURE: 98 F | SYSTOLIC BLOOD PRESSURE: 101 MMHG

## 2023-03-10 DIAGNOSIS — R31.9 URINARY TRACT INFECTION WITH HEMATURIA, SITE UNSPECIFIED: ICD-10-CM

## 2023-03-10 DIAGNOSIS — N39.0 URINARY TRACT INFECTION WITH HEMATURIA, SITE UNSPECIFIED: ICD-10-CM

## 2023-03-10 DIAGNOSIS — R10.9 LEFT FLANK PAIN: ICD-10-CM

## 2023-03-10 DIAGNOSIS — E07.9 THYROID DISORDER: ICD-10-CM

## 2023-03-10 DIAGNOSIS — N89.8 VAGINAL DISCHARGE: Primary | ICD-10-CM

## 2023-03-10 LAB
BILIRUB SERPL-MCNC: NORMAL MG/DL
BILIRUB UR QL STRIP: NEGATIVE
BLOOD URINE, POC: NORMAL
CLARITY UR: CLEAR
COLOR UR: ABNORMAL
COLOR, POC UA: YELLOW
GLUCOSE UR QL STRIP: NORMAL
GLUCOSE UR STRIP-MCNC: NORMAL MG/DL
KETONES UR QL STRIP: NORMAL
KETONES UR STRIP-SCNC: NEGATIVE MG/DL
LEUKOCYTE ESTERASE UR QL STRIP: ABNORMAL
LEUKOCYTE ESTERASE URINE, POC: NORMAL
MUCOUS, UA: ABNORMAL /LPF
NITRITE UR QL STRIP: NEGATIVE
NITRITE, POC UA: NORMAL
PH UR STRIP: 6 PH UNITS
PH, POC UA: 6
PROT UR QL STRIP: NEGATIVE
PROTEIN, POC: NORMAL
RBC # UR STRIP: NEGATIVE /UL
RBC #/AREA URNS HPF: 11 /HPF
SP GR UR STRIP: 1.02
SPECIFIC GRAVITY, POC UA: 1.02
SQUAMOUS #/AREA URNS LPF: ABNORMAL /HPF
TSH SERPL DL<=0.005 MIU/L-ACNC: 1.66 UIU/ML (ref 0.36–3.74)
UROBILINOGEN UR STRIP-ACNC: NORMAL MG/DL
UROBILINOGEN, POC UA: 0.2
WBC #/AREA URNS HPF: 13 /HPF

## 2023-03-10 PROCEDURE — 87491 CHLAMYDIA/GONORRHOEAE(GC), PCR: ICD-10-PCS | Mod: ,,, | Performed by: CLINICAL MEDICAL LABORATORY

## 2023-03-10 PROCEDURE — 87491 CHLMYD TRACH DNA AMP PROBE: CPT | Mod: ,,, | Performed by: CLINICAL MEDICAL LABORATORY

## 2023-03-10 PROCEDURE — 81003 POCT URINALYSIS W/O SCOPE: ICD-10-PCS | Mod: QW,,, | Performed by: NURSE PRACTITIONER

## 2023-03-10 PROCEDURE — 81003 URINALYSIS AUTO W/O SCOPE: CPT | Mod: QW,,, | Performed by: NURSE PRACTITIONER

## 2023-03-10 PROCEDURE — 81001 URINALYSIS, REFLEX TO URINE CULTURE: ICD-10-PCS | Mod: ,,, | Performed by: CLINICAL MEDICAL LABORATORY

## 2023-03-10 PROCEDURE — 87086 CULTURE, URINE: ICD-10-PCS | Mod: ,,, | Performed by: CLINICAL MEDICAL LABORATORY

## 2023-03-10 PROCEDURE — 87591 CHLAMYDIA/GONORRHOEAE(GC), PCR: ICD-10-PCS | Mod: ,,, | Performed by: CLINICAL MEDICAL LABORATORY

## 2023-03-10 PROCEDURE — 87086 URINE CULTURE/COLONY COUNT: CPT | Mod: ,,, | Performed by: CLINICAL MEDICAL LABORATORY

## 2023-03-10 PROCEDURE — 84443 TSH: ICD-10-PCS | Mod: ,,, | Performed by: CLINICAL MEDICAL LABORATORY

## 2023-03-10 PROCEDURE — 81001 URINALYSIS AUTO W/SCOPE: CPT | Mod: ,,, | Performed by: CLINICAL MEDICAL LABORATORY

## 2023-03-10 PROCEDURE — 84443 ASSAY THYROID STIM HORMONE: CPT | Mod: ,,, | Performed by: CLINICAL MEDICAL LABORATORY

## 2023-03-10 PROCEDURE — 87591 N.GONORRHOEAE DNA AMP PROB: CPT | Mod: ,,, | Performed by: CLINICAL MEDICAL LABORATORY

## 2023-03-10 PROCEDURE — 99214 PR OFFICE/OUTPT VISIT, EST, LEVL IV, 30-39 MIN: ICD-10-PCS | Mod: ,,, | Performed by: NURSE PRACTITIONER

## 2023-03-10 PROCEDURE — 99214 OFFICE O/P EST MOD 30 MIN: CPT | Mod: ,,, | Performed by: NURSE PRACTITIONER

## 2023-03-10 RX ORDER — NITROFURANTOIN 25; 75 MG/1; MG/1
100 CAPSULE ORAL 2 TIMES DAILY
Qty: 10 CAPSULE | Refills: 0 | Status: SHIPPED | OUTPATIENT
Start: 2023-03-10 | End: 2023-03-15

## 2023-03-10 RX ORDER — LEVOTHYROXINE SODIUM 75 UG/1
75 TABLET ORAL EVERY MORNING
Qty: 30 TABLET | Refills: 3 | Status: SHIPPED | OUTPATIENT
Start: 2023-03-10 | End: 2023-11-03 | Stop reason: SDUPTHER

## 2023-03-10 RX ORDER — PHENAZOPYRIDINE HYDROCHLORIDE 200 MG/1
200 TABLET, FILM COATED ORAL 3 TIMES DAILY PRN
Qty: 15 TABLET | Refills: 0 | Status: SHIPPED | OUTPATIENT
Start: 2023-03-10 | End: 2023-03-15

## 2023-03-10 NOTE — PROGRESS NOTES
EB Martin   [place] [address] [address] [place] [address], [place]  [address]     PATIENT NAME: Maggie Llamas  : 1970  DATE: 3/10/23  MRN: [id number]      Billing Provider: EB Martin  Level of Service: [place] OFFICE/OUTPT VISIT, EST, LEVL IV, 30-39 MIN  Patient PCP Information       Provider PCP Type    EB Gonzalez General            Reason for Visit / Chief Complaint: Abdominal Pain (Left side), female problems. requesting to speak with provider, and Medication Refill       Update PCP  Update Chief Complaint         History of Present Illness / Problem Focused Workflow     Maggie Llamas presents to the clinic with Abdominal Pain (Left side), female problems. requesting to speak with provider, and Medication Refill     52 y/o AA female presents for medication refills. Usually sees Cain GRIFFIN in our practice, but she was booked today. She needed to come in today to go ahead and have thyroid level drawn and receive refills of her thyroid medicine, she has been out of them for almost a month. She also has left side pain, mild left sided back pain, yellow vaginal discharge for 6 months. Has had a hysterectomy but still has ovaries. Mild tenderness when she presses over the left side of abdomen near umbilical region. Denies GI symptoms, but says that she has had constipation in the past.     Abdominal Pain    Medication Refill  Associated symptoms include abdominal pain.     Review of Systems     Review of Systems   Constitutional: Negative.    HENT: Negative.     Eyes: Negative.    Respiratory: Negative.     Cardiovascular: Negative.    Gastrointestinal:  Positive for abdominal pain.   Endocrine: Negative.    Genitourinary:  Positive for vaginal discharge. Negative for pelvic pain.   Musculoskeletal: Negative.    Integumentary:  Negative.   Neurological: Negative.    Psychiatric/Behavioral: Negative.     All other systems reviewed and are negative.     Medical / Social / Family History      Past Medical History:   Diagnosis Date    Hyperthyroidism        Past Surgical History:   Procedure Laterality Date    HYSTERECTOMY      THYROID SURGERY         Social History  Ms. Llamas  reports that she has never smoked. She has never used smokeless tobacco. She reports that she does not drink alcohol and does not use drugs.    Family History  Ms.'s Llamas family history is not on file.    Medications and Allergies     Medications  Outpatient Medications Marked as Taking for the 3/10/23 encounter (Office Visit) with EB Martin   Medication Sig Dispense Refill    citalopram (CELEXA) 10 MG tablet Take 1 tablet (10 mg total) by mouth once daily. 30 tablet 11    iron-vit c-b12-folic acid (ICAR-C PLUS) Tab 1 tablet 30 tablet 0       Allergies  Review of patient's allergies indicates:  No Known Allergies    Physical Examination     Vitals:    03/10/23 0840   BP: 101/62   Pulse: 79   Resp: 18   Temp: 97.8 °F (36.6 °C)     Physical Exam  Vitals and nursing note reviewed.   Constitutional:       Appearance: Normal appearance. She is normal weight.   HENT:      Head: Normocephalic and atraumatic.      Mouth/Throat:      Mouth: Mucous membranes are moist.      Pharynx: Oropharynx is clear.   Eyes:      Extraocular Movements: Extraocular movements intact.      Conjunctiva/sclera: Conjunctivae normal.      Pupils: Pupils are equal, round, and reactive to light.   Cardiovascular:      Rate and Rhythm: Normal rate and regular rhythm.      Pulses: Normal pulses.      Heart sounds: Normal heart sounds.   Pulmonary:      Effort: Pulmonary effort is normal.      Breath sounds: Normal breath sounds.   Abdominal:      General: Abdomen is flat. Bowel sounds are normal.      Palpations: Abdomen is soft.          Comments: Mild pin point tenderness over left abdomen x 6 months.    Musculoskeletal:         General: Normal range of motion.      Cervical back: Normal range of motion and neck supple.   Skin:     General: Skin is  warm and dry.      Capillary Refill: Capillary refill takes less than 2 seconds.   Neurological:      General: No focal deficit present.      Mental Status: She is alert and oriented to person, place, and time. Mental status is at baseline.   Psychiatric:         Mood and Affect: Mood normal.         Behavior: Behavior normal.         Thought Content: Thought content normal.         Judgment: Judgment normal.            Assessment and Plan (including Health Maintenance)      Problem List  Smart Sets  Document Outside HM   :    Plan:   Vaginal discharge  -     Chlamydia/GC, PCR; Future; Expected date: 03/10/2023  -     Bacterial Vaginosis; Future; Expected date: 03/10/2023    Left flank pain  -     POCT URINALYSIS W/O SCOPE  -     Urinalysis, Reflex to Urine Culture; Future; Expected date: 03/10/2023  -     phenazopyridine (PYRIDIUM) 200 MG tablet; Take 1 tablet (200 mg total) by mouth 3 (three) times daily as needed for Pain (pain from UTI).  Dispense: 15 tablet; Refill: 0  -     Urinalysis, Microscopic  -     Urine culture    Thyroid disorder  -     TSH; Future; Expected date: 03/10/2023  -     levothyroxine (SYNTHROID) 75 MCG tablet; Take 1 tablet (75 mcg total) by mouth every morning.  Dispense: 30 tablet; Refill: 3    Urinary tract infection with hematuria, site unspecified  -     nitrofurantoin, macrocrystal-monohydrate, (MACROBID) 100 MG capsule; Take 1 capsule (100 mg total) by mouth 2 (two) times daily. for 5 days  Dispense: 10 capsule; Refill: 0       Since we wont get results back until after the weekend, starting macrobid today so we can go ahead and tx for UTI since symptomatic. Pyridium for pain PRN. Will change or add to medicine once results return. F/u with PCP in 2-3 weeks for recheck and continuity of care.     Health Maintenance Due   Topic Date Due    Hepatitis C Screening  Never done    COVID-19 Vaccine (1) Never done    HIV Screening  Never done    TETANUS VACCINE  Never done    Hemoglobin A1c  (Diabetic Prevention Screening)  Never done    Colorectal Cancer Screening  Never done    Shingles Vaccine (1 of 2) Never done    Influenza Vaccine (1) Never done       Problem List Items Addressed This Visit          Endocrine    Thyroid disorder    Relevant Medications    levothyroxine (SYNTHROID) 75 MCG tablet    Other Relevant Orders    TSH (Completed)     Other Visit Diagnoses       Vaginal discharge    -  Primary    Relevant Orders    Chlamydia/GC, PCR (Completed)    Bacterial Vaginosis    Left flank pain        Relevant Medications    phenazopyridine (PYRIDIUM) 200 MG tablet    Other Relevant Orders    POCT URINALYSIS W/O SCOPE (Completed)    Urinalysis, Reflex to Urine Culture (Completed)    Urinalysis, Microscopic (Completed)    Urine culture (Completed)    Urinary tract infection with hematuria, site unspecified        Relevant Medications    nitrofurantoin, macrocrystal-monohydrate, (MACROBID) 100 MG capsule            Health Maintenance Topics with due status: Not Due       Topic Last Completion Date    Lipid Panel 05/13/2022    Mammogram 11/16/2022       Future Appointments   Date Time Provider Department Center   3/24/2023  8:00 AM Cain Manning [facility]   5/15/2023 11:00 AM Cain Manning [facility]        Patient Instructions   I went ahead and refilled your thyroid medicine since your levels always appear to be normal. But if it is abnormal then I may have to change your dose.   Since you are having symptoms possibly related to a UTI and we wont get other results until after the weekend, I will go ahead and try a course of antibiotics for UTI and pyridium to use as needed for the side pain or painful urination.  We will call you with lab results when they return and treat you accordingly.  Also try some aleve and tylenol for your side pain.   Drink lots of water to stay hydrated, eat a healthy diet. Eat high fiber if you are concerned about constipation,  you can also take a daily stool softener.   Follow up with your PCP in a few weeks for continuity of care, this will allow you to discuss today with her so if treatment has worked, then you can let her know or if you are still having symptoms, then yall can discuss a further plan.    Follow up in about 2 weeks (around 3/24/2023) for F/u with PCP.     Signature:  EB Martin      Date of encounter: 3/10/23

## 2023-03-10 NOTE — PATIENT INSTRUCTIONS
I went ahead and refilled your thyroid medicine since your levels always appear to be normal. But if it is abnormal then I may have to change your dose.   Since you are having symptoms possibly related to a UTI and we wont get other results until after the weekend, I will go ahead and try a course of antibiotics for UTI and pyridium to use as needed for the side pain or painful urination.  We will call you with lab results when they return and treat you accordingly.  Also try some aleve and tylenol for your side pain.   Drink lots of water to stay hydrated, eat a healthy diet. Eat high fiber if you are concerned about constipation, you can also take a daily stool softener.   Follow up with your PCP in a few weeks for continuity of care, this will allow you to discuss today with her so if treatment has worked, then you can let her know or if you are still having symptoms, then yall can discuss a further plan.

## 2023-03-11 LAB
CHLAMYDIA BY PCR: NEGATIVE
N. GONORRHOEAE (GC) BY PCR: NEGATIVE

## 2023-03-12 LAB — UA COMPLETE W REFLEX CULTURE PNL UR: NORMAL

## 2023-03-13 ENCOUNTER — TELEPHONE (OUTPATIENT)
Dept: FAMILY MEDICINE | Facility: CLINIC | Age: 53
End: 2023-03-13

## 2023-03-13 NOTE — TELEPHONE ENCOUNTER
----- Message from EB Martin sent at 3/13/2023 11:44 AM CDT -----  She does have bacteria in her urine so she needs to take full course of antibiotics that were sent to her pharmacy last week. Her chlamydia and gonorrhea tests were negative. It appears that the swab that she did was not received by the lab. If she will return, she can just come collect that and we can resend it for her, she does not have to check in.   Thyroid results are normal.  Continue follow up with PCP in a couple of weeks to recheck urine and chronic abdominal pain as discussed.

## 2023-05-10 ENCOUNTER — PATIENT OUTREACH (OUTPATIENT)
Dept: ADMINISTRATIVE | Facility: HOSPITAL | Age: 53
End: 2023-05-10

## 2023-05-10 NOTE — PROGRESS NOTES
Health Maintenance Due   Topic Date Due    Hepatitis C Screening  Never done    COVID-19 Vaccine (1) Never done    HIV Screening  Never done    TETANUS VACCINE  Never done    Hemoglobin A1c (Diabetic Prevention Screening)  Never done    Colorectal Cancer Screening  Never done    Shingles Vaccine (1 of 2) Never done     Reviewed measures for population health   HM TOPICS DUE, discuss at upcoming appt. If done, where?

## 2023-05-12 ENCOUNTER — OFFICE VISIT (OUTPATIENT)
Dept: FAMILY MEDICINE | Facility: CLINIC | Age: 53
End: 2023-05-12

## 2023-05-12 VITALS
SYSTOLIC BLOOD PRESSURE: 116 MMHG | BODY MASS INDEX: 26.2 KG/M2 | TEMPERATURE: 98 F | WEIGHT: 163 LBS | OXYGEN SATURATION: 98 % | DIASTOLIC BLOOD PRESSURE: 75 MMHG | RESPIRATION RATE: 18 BRPM | HEIGHT: 66 IN | HEART RATE: 76 BPM

## 2023-05-12 DIAGNOSIS — N89.8 VAGINAL DISCHARGE: ICD-10-CM

## 2023-05-12 DIAGNOSIS — R10.9 SIDE PAIN: Primary | ICD-10-CM

## 2023-05-12 LAB
BACTERIA #/AREA URNS HPF: ABNORMAL /HPF
BILIRUB SERPL-MCNC: NORMAL MG/DL
BILIRUB UR QL STRIP: NEGATIVE
BLOOD URINE, POC: NORMAL
CLARITY UR: CLEAR
COLOR UR: ABNORMAL
COLOR, POC UA: YELLOW
GLUCOSE UR QL STRIP: NORMAL
GLUCOSE UR STRIP-MCNC: NORMAL MG/DL
KETONES UR QL STRIP: NORMAL
KETONES UR STRIP-SCNC: NEGATIVE MG/DL
LEUKOCYTE ESTERASE UR QL STRIP: ABNORMAL
LEUKOCYTE ESTERASE URINE, POC: NORMAL
MUCOUS, UA: ABNORMAL /LPF
NITRITE UR QL STRIP: NEGATIVE
NITRITE, POC UA: NORMAL
PH UR STRIP: 5.5 PH UNITS
PH, POC UA: 6
PROT UR QL STRIP: NEGATIVE
PROTEIN, POC: NORMAL
RBC # UR STRIP: ABNORMAL /UL
RBC #/AREA URNS HPF: 16 /HPF
SP GR UR STRIP: 1.02
SPECIFIC GRAVITY, POC UA: 1.02
SQUAMOUS #/AREA URNS LPF: ABNORMAL /HPF
UROBILINOGEN UR STRIP-ACNC: NORMAL MG/DL
UROBILINOGEN, POC UA: 1
WBC #/AREA URNS HPF: 10 /HPF

## 2023-05-12 PROCEDURE — 99213 PR OFFICE/OUTPT VISIT, EST, LEVL III, 20-29 MIN: ICD-10-PCS | Mod: ,,, | Performed by: NURSE PRACTITIONER

## 2023-05-12 PROCEDURE — 87510 BACTERIAL VAGINOSIS: ICD-10-PCS | Mod: ,,, | Performed by: CLINICAL MEDICAL LABORATORY

## 2023-05-12 PROCEDURE — 99213 OFFICE O/P EST LOW 20 MIN: CPT | Mod: ,,, | Performed by: NURSE PRACTITIONER

## 2023-05-12 PROCEDURE — 87660 TRICHOMONAS VAGIN DIR PROBE: CPT | Mod: ,,, | Performed by: CLINICAL MEDICAL LABORATORY

## 2023-05-12 PROCEDURE — 87491 CHLMYD TRACH DNA AMP PROBE: CPT | Mod: ,,, | Performed by: CLINICAL MEDICAL LABORATORY

## 2023-05-12 PROCEDURE — 81003 POCT URINALYSIS W/O SCOPE: ICD-10-PCS | Mod: QW,,, | Performed by: NURSE PRACTITIONER

## 2023-05-12 PROCEDURE — 81001 URINALYSIS AUTO W/SCOPE: CPT | Mod: ,,, | Performed by: CLINICAL MEDICAL LABORATORY

## 2023-05-12 PROCEDURE — 87510 GARDNER VAG DNA DIR PROBE: CPT | Mod: ,,, | Performed by: CLINICAL MEDICAL LABORATORY

## 2023-05-12 PROCEDURE — 87660 BACTERIAL VAGINOSIS: ICD-10-PCS | Mod: ,,, | Performed by: CLINICAL MEDICAL LABORATORY

## 2023-05-12 PROCEDURE — 87480 CANDIDA DNA DIR PROBE: CPT | Mod: ,,, | Performed by: CLINICAL MEDICAL LABORATORY

## 2023-05-12 PROCEDURE — 87591 CHLAMYDIA/GONORRHOEAE(GC), PCR: ICD-10-PCS | Mod: ,,, | Performed by: CLINICAL MEDICAL LABORATORY

## 2023-05-12 PROCEDURE — 81003 URINALYSIS AUTO W/O SCOPE: CPT | Mod: QW,,, | Performed by: NURSE PRACTITIONER

## 2023-05-12 PROCEDURE — 87480 BACTERIAL VAGINOSIS: ICD-10-PCS | Mod: ,,, | Performed by: CLINICAL MEDICAL LABORATORY

## 2023-05-12 PROCEDURE — 87491 CHLAMYDIA/GONORRHOEAE(GC), PCR: ICD-10-PCS | Mod: ,,, | Performed by: CLINICAL MEDICAL LABORATORY

## 2023-05-12 PROCEDURE — 81001 URINALYSIS, REFLEX TO URINE CULTURE: ICD-10-PCS | Mod: ,,, | Performed by: CLINICAL MEDICAL LABORATORY

## 2023-05-12 PROCEDURE — 87591 N.GONORRHOEAE DNA AMP PROB: CPT | Mod: ,,, | Performed by: CLINICAL MEDICAL LABORATORY

## 2023-05-12 NOTE — LETTER
May 12, 2023    Maggie Llamas  [address] [place] [address]             [facility]  Family Medicine  [address] [facility] [address]  Phone: [phone]  Fax: [phone]   May 12, 2023     Patient: Maggie Llamas   YOB: 1970   Date of Visit: 5/12/2023       To Whom it May Concern:    Maggie Llamas was seen in my clinic on 5/12/2023. She may return to work on 5/13/2023    Please excuse her from any classes or work missed.    If you have any questions or concerns, please don't hesitate to call.    Sincerely,         EB Martin

## 2023-05-12 NOTE — PATIENT INSTRUCTIONS
We will call you with results when they return, likely to return Monday. We will treat according to those results and do further work up if all that is negative, or if we treat you for something that returns positive and you are still having symptoms.   Drink lots of water to stay hydrated.  Try taking aleve for your symptoms in the meantime to see if that helps at all.   Most likely will try to set you up to have some imaging done to rule out kidney stones.    If you begin experiencing severe pain over the weekend, go to the ER for further evaluation.

## 2023-05-12 NOTE — PROGRESS NOTES
EB Martin   [place] [address] [place]  [address]     PATIENT NAME: Maggie Llamas  : 1970  DATE: 23  MRN: [id number]      Billing Provider: EB Martin  Level of Service: [place] OFFICE/OUTPT VISIT, EST, LEVL III, 20-29 MIN  Patient PCP Information       Provider PCP Type    Primary Doctor No General            Reason for Visit / Chief Complaint: Flank Pain (Pt c/o of sharp side pain on the left.)       Update PCP  Update Chief Complaint         History of Present Illness / Problem Focused Workflow     Maggie Llamas presents to the clinic with Flank Pain (Pt c/o of sharp side pain on the left.)     54 y/o female presents for left side pain. She has had this pain going on for almost a year now. It feels like it is always there, but seems to get worse at times. Always on the left side. Denies N/V/D. Sometimes she feels that she may be constipated and she will start taking stool softeners to have BM, feels like it slightly improves, but does not completely resolve. Denies dysuria or hematuria. Hx of partial hysterectomy, still has both ovaries. Has had yellow colored vaginal discharge for about 1 month. Is sexually active and does not use protection since having only 1 partner. No fevers. No history of kidney stones.    Flank Pain      Review of Systems     Review of Systems   Constitutional: Negative.    HENT: Negative.     Eyes: Negative.    Respiratory: Negative.     Cardiovascular: Negative.    Gastrointestinal: Negative.    Endocrine: Negative.    Genitourinary:  Positive for flank pain and vaginal discharge.   Integumentary:  Negative.   Neurological: Negative.    Psychiatric/Behavioral: Negative.     All other systems reviewed and are negative.     Medical / Social / Family History     Past Medical History:   Diagnosis Date    Hyperthyroidism        Past Surgical History:   Procedure Laterality Date    HYSTERECTOMY      THYROID SURGERY         Social  History  Ms. Llamas  reports that she has never smoked. She has never used smokeless tobacco. She reports that she does not drink alcohol and does not use drugs.    Family History  Ms.'s Farhad family history is not on file.    Medications and Allergies     Medications  Outpatient Medications Marked as Taking for the 5/12/23 encounter (Office Visit) with EB Martin   Medication Sig Dispense Refill    citalopram (CELEXA) 10 MG tablet Take 1 tablet (10 mg total) by mouth once daily. 30 tablet 11    iron-vit c-b12-folic acid (ICAR-C PLUS) Tab 1 tablet 30 tablet 0    levothyroxine (SYNTHROID) 75 MCG tablet Take 1 tablet (75 mcg total) by mouth every morning. 30 tablet 3       Allergies  Review of patient's allergies indicates:  No Known Allergies    Physical Examination     Vitals:    05/12/23 1406   BP: 116/75   Pulse: 76   Resp: 18   Temp: 98.4 °F (36.9 °C)     Physical Exam  Vitals and nursing note reviewed.   Constitutional:       Appearance: Normal appearance. She is normal weight.   HENT:      Head: Normocephalic and atraumatic.      Mouth/Throat:      Mouth: Mucous membranes are moist.      Pharynx: Oropharynx is clear.   Eyes:      Extraocular Movements: Extraocular movements intact.      Conjunctiva/sclera: Conjunctivae normal.      Pupils: Pupils are equal, round, and reactive to light.   Cardiovascular:      Rate and Rhythm: Normal rate and regular rhythm.      Pulses: Normal pulses.      Heart sounds: Normal heart sounds.   Pulmonary:      Effort: Pulmonary effort is normal.      Breath sounds: Normal breath sounds.   Abdominal:      General: Abdomen is flat. Bowel sounds are normal.      Palpations: Abdomen is soft.      Tenderness: There is abdominal tenderness. There is no right CVA tenderness, left CVA tenderness or rebound.      Hernia: No hernia is present.          Comments: Mild Tenderness just left of umbilical area x 1 year.    Musculoskeletal:         General: Normal range of motion.       Cervical back: Normal range of motion and neck supple.   Skin:     General: Skin is warm and dry.      Capillary Refill: Capillary refill takes less than 2 seconds.   Neurological:      General: No focal deficit present.      Mental Status: She is alert and oriented to person, place, and time. Mental status is at baseline.   Psychiatric:         Mood and Affect: Mood normal.         Behavior: Behavior normal.         Thought Content: Thought content normal.         Judgment: Judgment normal.            Assessment and Plan (including Health Maintenance)      Problem List  Smart Sets  Document Outside HM   :    Plan:   Side pain  -     POCT URINALYSIS W/O SCOPE  -     Urinalysis, Reflex to Urine Culture; Future; Expected date: 05/12/2023    Vaginal discharge  -     Bacterial Vaginosis; Future; Expected date: 05/12/2023  -     Chlamydia/GC, PCR; Future; Expected date: 05/12/2023  -     Urinalysis, Reflex to Urine Culture; Future; Expected date: 05/12/2023       Main differentials include: Kidney Stone, Ovarian Cyst, bowel gas pain, STD, UTI    Health Maintenance Due   Topic Date Due    Hepatitis C Screening  Never done    COVID-19 Vaccine (1) Never done    HIV Screening  Never done    TETANUS VACCINE  Never done    Hemoglobin A1c (Diabetic Prevention Screening)  Never done    Colorectal Cancer Screening  Never done    Shingles Vaccine (1 of 2) Never done       Problem List Items Addressed This Visit    None  Visit Diagnoses       Side pain    -  Primary    Relevant Orders    POCT URINALYSIS W/O SCOPE (Completed)    Urinalysis, Reflex to Urine Culture    Vaginal discharge        Relevant Orders    Bacterial Vaginosis    Chlamydia/GC, PCR    Urinalysis, Reflex to Urine Culture            Health Maintenance Topics with due status: Not Due       Topic Last Completion Date    Influenza Vaccine 10/14/2014    Lipid Panel 05/13/2022    Mammogram 11/16/2022       Future Appointments   Date Time Provider Department Center    5/15/2023 11:00 AM EB Gonzalez [facility]        Patient Instructions   We will call you with results when they return, likely to return Monday. We will treat according to those results and do further work up if all that is negative, or if we treat you for something that returns positive and you are still having symptoms.   Drink lots of water to stay hydrated.  Try taking aleve for your symptoms in the meantime to see if that helps at all.   Most likely will try to set you up to have some imaging done to rule out kidney stones.    If you begin experiencing severe pain over the weekend, go to the ER for further evaluation.     Follow up if symptoms worsen or fail to improve.     Signature:  EB Martin      Date of encounter: 5/12/23

## 2023-05-13 LAB
CHLAMYDIA BY PCR: NEGATIVE
N. GONORRHOEAE (GC) BY PCR: NEGATIVE

## 2023-05-15 DIAGNOSIS — R31.9 URINARY TRACT INFECTION WITH HEMATURIA, SITE UNSPECIFIED: Primary | ICD-10-CM

## 2023-05-15 DIAGNOSIS — N39.0 URINARY TRACT INFECTION WITH HEMATURIA, SITE UNSPECIFIED: Primary | ICD-10-CM

## 2023-05-15 LAB
CANDIDA SPECIES: NEGATIVE
GARDNERELLA: POSITIVE
TRICHOMONAS: POSITIVE

## 2023-05-15 RX ORDER — NITROFURANTOIN 25; 75 MG/1; MG/1
100 CAPSULE ORAL 2 TIMES DAILY
Qty: 10 CAPSULE | Refills: 0 | Status: SHIPPED | OUTPATIENT
Start: 2023-05-15 | End: 2023-05-20

## 2023-05-16 ENCOUNTER — TELEPHONE (OUTPATIENT)
Dept: FAMILY MEDICINE | Facility: CLINIC | Age: 53
End: 2023-05-16

## 2023-05-16 DIAGNOSIS — N76.0 BV (BACTERIAL VAGINOSIS): Primary | ICD-10-CM

## 2023-05-16 DIAGNOSIS — A59.9 TRICHOMONAS INFECTION: ICD-10-CM

## 2023-05-16 DIAGNOSIS — B96.89 BV (BACTERIAL VAGINOSIS): Primary | ICD-10-CM

## 2023-05-16 RX ORDER — METRONIDAZOLE 500 MG/1
500 TABLET ORAL EVERY 12 HOURS
Qty: 14 TABLET | Refills: 0 | Status: SHIPPED | OUTPATIENT
Start: 2023-05-16 | End: 2023-05-23

## 2023-05-16 NOTE — TELEPHONE ENCOUNTER
----- Message from EB Martin sent at 5/16/2023  9:43 AM CDT -----  Positive for BV and trichomonas on her swab. I am going to send off medicine to pharmacy for treatment. It will be one antibiotic that covers both of them. Take full course as prescribed to resolve infection. Trich is a STD, partner(s) will also need to be treated. You can get reinfected if you are not both treated and wait enough time after treatment to have intercourse.

## 2023-05-17 ENCOUNTER — TELEPHONE (OUTPATIENT)
Dept: FAMILY MEDICINE | Facility: CLINIC | Age: 53
End: 2023-05-17

## 2023-05-18 ENCOUNTER — TELEPHONE (OUTPATIENT)
Dept: FAMILY MEDICINE | Facility: CLINIC | Age: 53
End: 2023-05-18

## 2023-05-18 NOTE — TELEPHONE ENCOUNTER
PT CALLED CLINIC STATING SHE NEVER RECEIVED MEDICATION FOR BV AND TRICHOMONAS. CALL CAME AFTER INFORMING PT TO MAKE SURE PARTNER IS TREATED TO PREVENT BEING INFECTED AGAIN. NURSE CALLED PT PHARMACY TO FIND OUT IF PT RECEIVED PRESCRIPTION. [facility] CONFIRMED PT REC'D MEDICATION ON 5/16/23 WHEN PROVIDER ORDERED MEDS. CALLED PT BACK THIS DAY TO CONFIRM RECEIPT OF MEDICATION. PT STATED THAT YES SHE RECEIVED IT AND CLAIMED TO BE UNAWARE. INSTRUCTED PT TO TAKE MEDS IN ITS ENTIRETY.

## 2023-09-21 ENCOUNTER — HOSPITAL ENCOUNTER (EMERGENCY)
Facility: HOSPITAL | Age: 53
Discharge: HOME | End: 2023-09-21

## 2023-09-21 VITALS
OXYGEN SATURATION: 98 % | WEIGHT: 160 LBS | HEART RATE: 108 BPM | HEIGHT: 66 IN | RESPIRATION RATE: 18 BRPM | BODY MASS INDEX: 25.71 KG/M2 | SYSTOLIC BLOOD PRESSURE: 118 MMHG | DIASTOLIC BLOOD PRESSURE: 81 MMHG

## 2023-09-21 DIAGNOSIS — B34.9 VIRAL SYNDROME: Primary | ICD-10-CM

## 2023-09-21 LAB — SARS-COV-2 RDRP RESP QL NAA+PROBE: NEGATIVE

## 2023-09-21 PROCEDURE — 99284 EMERGENCY DEPT VISIT MOD MDM: CPT

## 2023-09-21 PROCEDURE — 63600175 PHARM REV CODE 636 W HCPCS

## 2023-09-21 PROCEDURE — 99284 PR EMERGENCY DEPT VISIT,LEVEL IV: ICD-10-PCS | Mod: ,,,

## 2023-09-21 PROCEDURE — 99284 EMERGENCY DEPT VISIT MOD MDM: CPT | Mod: ,,,

## 2023-09-21 PROCEDURE — 87635 SARS-COV-2 COVID-19 AMP PRB: CPT

## 2023-09-21 PROCEDURE — 96372 THER/PROPH/DIAG INJ SC/IM: CPT

## 2023-09-21 RX ORDER — DEXAMETHASONE SODIUM PHOSPHATE 4 MG/ML
4 INJECTION, SOLUTION INTRA-ARTICULAR; INTRALESIONAL; INTRAMUSCULAR; INTRAVENOUS; SOFT TISSUE
Status: COMPLETED | OUTPATIENT
Start: 2023-09-21 | End: 2023-09-21

## 2023-09-21 RX ORDER — CETIRIZINE HYDROCHLORIDE, PSEUDOEPHEDRINE HYDROCHLORIDE 5; 120 MG/1; MG/1
1 TABLET, FILM COATED, EXTENDED RELEASE ORAL DAILY
Qty: 12 TABLET | Refills: 0 | Status: SHIPPED | OUTPATIENT
Start: 2023-09-21 | End: 2023-10-01

## 2023-09-21 RX ORDER — ONDANSETRON 2 MG/ML
4 INJECTION INTRAMUSCULAR; INTRAVENOUS
Status: COMPLETED | OUTPATIENT
Start: 2023-09-21 | End: 2023-09-21

## 2023-09-21 RX ORDER — ONDANSETRON 4 MG/1
4 TABLET, FILM COATED ORAL EVERY 6 HOURS
Qty: 12 TABLET | Refills: 0 | Status: SHIPPED | OUTPATIENT
Start: 2023-09-21

## 2023-09-21 RX ORDER — AZITHROMYCIN 250 MG/1
TABLET, FILM COATED ORAL
Qty: 6 TABLET | Refills: 0 | Status: SHIPPED | OUTPATIENT
Start: 2023-09-21 | End: 2023-09-26

## 2023-09-21 RX ADMIN — DEXAMETHASONE SODIUM PHOSPHATE 4 MG: 4 INJECTION, SOLUTION INTRA-ARTICULAR; INTRALESIONAL; INTRAMUSCULAR; INTRAVENOUS; SOFT TISSUE at 06:09

## 2023-09-21 RX ADMIN — ONDANSETRON 4 MG: 2 INJECTION INTRAMUSCULAR; INTRAVENOUS at 06:09

## 2023-09-21 NOTE — DISCHARGE INSTRUCTIONS
Take medication as directed by the label on the bottle, follow up with PCP, drink plenty of water, return to the ER if your symptoms worsen.

## 2023-09-21 NOTE — ED PROVIDER NOTES
Encounter Date: 9/21/2023       History     Chief Complaint   Patient presents with    COVID-19 Concerns     C/o nasal congestion, fever, chills since last week. Reports nausea/vomiting/diarrhea started today. 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      Patient is a 52 y/o AAF with a PMHx of hypothyroidism presents to the Emergency Department with c/o Covid symptoms. Patient stated that her symptoms have been ongoing for past 5 days, works at [employer] in [place] where there has been lots of residents diagnosed with Covid-19.     The history is provided by the patient.     Review of patient's allergies indicates:  No Known Allergies  Past Medical History:   Diagnosis Date    Hyperthyroidism      Past Surgical History:   Procedure Laterality Date    HYSTERECTOMY      THYROID SURGERY       History reviewed. No pertinent family history.  Social History     Tobacco Use    Smoking status: Never    Smokeless tobacco: Never   Substance Use Topics    Alcohol use: Never    Drug use: Never     Review of Systems   Constitutional: Negative.    HENT: Negative.     Eyes: Negative.    Respiratory: Negative.     Cardiovascular: Negative.    Gastrointestinal:  Positive for abdominal pain, diarrhea, nausea and vomiting.   Endocrine: Negative.    Genitourinary: Negative.    Musculoskeletal: Negative.    Skin: Negative.    Allergic/Immunologic: Negative.    Neurological: Negative.    Hematological: Negative.    Psychiatric/Behavioral: Negative.         Physical Exam     Initial Vitals [09/21/23 1746]   BP Pulse Resp Temp SpO2   118/81 108 18 -- 98 %      MAP       --         Physical Exam    Nursing note and vitals reviewed.  Constitutional: Vital signs are normal. She appears well-developed and well-nourished. She is not diaphoretic. She is cooperative.  Non-toxic appearance. She does not have a sickly appearance. She appears ill. No distress.   Cardiovascular:  Normal rate, regular rhythm, S1 normal, S2 normal, normal heart sounds,  intact distal pulses and normal pulses.     Exam reveals no gallop, no S3, no S4, no distant heart sounds and no friction rub.       No murmur heard.  No systolic murmur is present.  Pulmonary/Chest: Effort normal and breath sounds normal.   Abdominal: Abdomen is soft and flat. Bowel sounds are normal. There is no abdominal tenderness. No hernia.   No left CVA tenderness. There is no rebound, no guarding, no tenderness at McBurney's point and negative Robison's sign. negative obturator sign, negative psoas sign and negative Rovsing's sign    Lymphadenopathy:     She has no cervical adenopathy.     She has no axillary adenopathy.   Neurological: She is alert and oriented to person, place, and time. She has normal strength. She displays normal reflexes. No cranial nerve deficit or sensory deficit. She displays a negative Romberg sign. Coordination and gait normal. GCS eye subscore is 4. GCS verbal subscore is 5. GCS motor subscore is 6.   Skin: Skin is warm, dry and intact. Capillary refill takes less than 2 seconds. No rash noted.   Psychiatric: She has a normal mood and affect. Her speech is normal and behavior is normal. Judgment and thought content normal. Cognition and memory are normal.         Medical Screening Exam   See Full Note    ED Course   Procedures  Labs Reviewed   SARS-COV-2 RNA AMPLIFICATION, QUAL - Normal    Narrative:     Negative SARS-CoV results should not be used as the sole basis for treatment or patient management decisions; negative results should be considered in the context of a patient's recent exposures, history and the presene of clinical signs and symptoms consistent with COVID-19.  Negative results should be treated as presumptive and confirmed by molecular assay, if necessary for patient management.          Imaging Results    None          Medications   ondansetron injection 4 mg (4 mg Intramuscular Given 9/21/23 1807)   dexAMETHasone injection 4 mg (4 mg Intramuscular Given 9/21/23  1807)     Medical Decision Making  Physical Exam    Nursing note and vitals reviewed.  Constitutional: Vital signs are normal. She appears well-developed and well-nourished. She is not diaphoretic. She is cooperative.  Non-toxic appearance. She does not have a sickly appearance. She appears ill. No distress.   Cardiovascular:  Normal rate, regular rhythm, S1 normal, S2 normal, normal heart sounds, intact distal pulses and normal pulses.     Exam reveals no gallop, no S3, no S4, no distant heart sounds and no friction rub.       No murmur heard.  No systolic murmur is present.  Pulmonary/Chest: Effort normal and breath sounds normal.   Abdominal: Abdomen is soft and flat. Bowel sounds are normal. There is no abdominal tenderness. No hernia.   No left CVA tenderness. There is no rebound, no guarding, no tenderness at McBurney's point and negative Robison's sign. negative obturator sign, negative psoas sign and negative Rovsing's sign    Lymphadenopathy:     She has no cervical adenopathy.     She has no axillary adenopathy.   Neurological: She is alert and oriented to person, place, and time. She has normal strength. She displays normal reflexes. No cranial nerve deficit or sensory deficit. She displays a negative Romberg sign. Coordination and gait normal. GCS eye subscore is 4. GCS verbal subscore is 5. GCS motor subscore is 6.   Skin: Skin is warm, dry and intact. Capillary refill takes less than 2 seconds. No rash noted.   Psychiatric: She has a normal mood and affect. Her speech is normal and behavior is normal. Judgment and thought content normal. Cognition and memory are normal.         Amount and/or Complexity of Data Reviewed  Labs:  Decision-making details documented in ED Course.    Risk  OTC drugs.  Prescription drug management.               ED Course as of 09/21/23 1817   Thu Sep 21, 2023   1810 ID NOW COVID-19, (FRANCINE): Negative [AC]   1812 Lab results reviewed by me and discussed with patient.  [AC]       ED Course User Index  [AC] John Murray FNP                    Clinical Impression:   Final diagnoses:  [B34.9] Viral syndrome (Primary)        ED Disposition Condition    Discharge Stable          ED Prescriptions       Medication Sig Dispense Start Date End Date Auth. Provider    azithromycin (Z-TENZIN) 250 MG tablet Take 2 tablets by mouth on day 1; Take 1 tablet by mouth on days 2-5 6 tablet 9/21/2023 9/26/2023 John Murray FNP    cetirizine-pseudoephedrine 5-120 mg Tb12 Take 1 tablet by mouth once daily. for 10 days 12 tablet 9/21/2023 10/1/2023 John Murray FNP    ondansetron (ZOFRAN) 4 MG tablet Take 1 tablet (4 mg total) by mouth every 6 (six) hours. 12 tablet 9/21/2023 -- John Murray FNP          Follow-up Information       Follow up With Specialties Details Why Contact Info    local pcp   As needed, If symptoms worsen              John Murray FNP  09/21/23 3301

## 2023-09-21 NOTE — Clinical Note
"Maggie Steenlacey Llamas was seen and treated in our emergency department on 9/21/2023.  She may return to work on 09/24/2023.       If you have any questions or concerns, please don't hesitate to call.      John Murray, EB"

## 2023-11-03 ENCOUNTER — OFFICE VISIT (OUTPATIENT)
Dept: FAMILY MEDICINE | Facility: CLINIC | Age: 53
End: 2023-11-03

## 2023-11-03 VITALS
HEIGHT: 62 IN | SYSTOLIC BLOOD PRESSURE: 119 MMHG | WEIGHT: 159.19 LBS | BODY MASS INDEX: 29.3 KG/M2 | OXYGEN SATURATION: 100 % | HEART RATE: 75 BPM | TEMPERATURE: 99 F | DIASTOLIC BLOOD PRESSURE: 79 MMHG

## 2023-11-03 DIAGNOSIS — E07.9 THYROID DISORDER: Primary | ICD-10-CM

## 2023-11-03 DIAGNOSIS — R60.0 LOWER EXTREMITY EDEMA: ICD-10-CM

## 2023-11-03 LAB
ANION GAP SERPL CALCULATED.3IONS-SCNC: 6 MMOL/L (ref 7–16)
BUN SERPL-MCNC: 14 MG/DL (ref 7–18)
BUN/CREAT SERPL: 18 (ref 6–20)
CALCIUM SERPL-MCNC: 8.7 MG/DL (ref 8.5–10.1)
CHLORIDE SERPL-SCNC: 108 MMOL/L (ref 98–107)
CO2 SERPL-SCNC: 29 MMOL/L (ref 21–32)
CREAT SERPL-MCNC: 0.76 MG/DL (ref 0.55–1.02)
EGFR (NO RACE VARIABLE) (RUSH/TITUS): 95 ML/MIN/1.73M2
GLUCOSE SERPL-MCNC: 88 MG/DL (ref 74–106)
POTASSIUM SERPL-SCNC: 3.5 MMOL/L (ref 3.5–5.1)
SODIUM SERPL-SCNC: 139 MMOL/L (ref 136–145)

## 2023-11-03 PROCEDURE — 90471 IMMUNIZATION ADMIN: CPT | Mod: ,,, | Performed by: FAMILY MEDICINE

## 2023-11-03 PROCEDURE — 80048 BASIC METABOLIC PANEL: ICD-10-PCS | Mod: ,,, | Performed by: CLINICAL MEDICAL LABORATORY

## 2023-11-03 PROCEDURE — 90471 FLU VACCINE (QUAD) GREATER THAN OR EQUAL TO 3YO PRESERVATIVE FREE IM: ICD-10-PCS | Mod: ,,, | Performed by: FAMILY MEDICINE

## 2023-11-03 PROCEDURE — 90686 IIV4 VACC NO PRSV 0.5 ML IM: CPT | Mod: ,,, | Performed by: FAMILY MEDICINE

## 2023-11-03 PROCEDURE — 99214 OFFICE O/P EST MOD 30 MIN: CPT | Mod: 25,,, | Performed by: FAMILY MEDICINE

## 2023-11-03 PROCEDURE — 99214 PR OFFICE/OUTPT VISIT, EST, LEVL IV, 30-39 MIN: ICD-10-PCS | Mod: 25,,, | Performed by: FAMILY MEDICINE

## 2023-11-03 PROCEDURE — 90686 FLU VACCINE (QUAD) GREATER THAN OR EQUAL TO 3YO PRESERVATIVE FREE IM: ICD-10-PCS | Mod: ,,, | Performed by: FAMILY MEDICINE

## 2023-11-03 PROCEDURE — 80048 BASIC METABOLIC PNL TOTAL CA: CPT | Mod: ,,, | Performed by: CLINICAL MEDICAL LABORATORY

## 2023-11-03 RX ORDER — LEVOTHYROXINE SODIUM 75 UG/1
75 TABLET ORAL EVERY MORNING
Qty: 90 TABLET | Refills: 2 | Status: SHIPPED | OUTPATIENT
Start: 2023-11-03

## 2023-11-03 RX ORDER — LEVOTHYROXINE SODIUM 75 UG/1
75 TABLET ORAL EVERY MORNING
Qty: 30 TABLET | Refills: 3 | Status: SHIPPED | OUTPATIENT
Start: 2023-11-03 | End: 2023-11-03

## 2023-11-03 NOTE — PROGRESS NOTES
Marj Dimas DO        PATIENT NAME: Maggie Llamas  : 1970  DATE: 11/3/23  MRN: [id number]      Reason for Visit / Chief Complaint: Thyroid Problem (Patients states she would like to have her thyroid checked), Foot Swelling (Patients right ankle is swollen.  Being this way for about a year), and Flu Vaccine (Patient would like to get her flu shot today if possible)     History of Present Illness / Problem Focused Workflow     Maggie Llamas presents to the clinic with Thyroid Problem (Patients states she would like to have her thyroid checked), Foot Swelling (Patients right ankle is swollen.  Being this way for about a year), and Flu Vaccine (Patient would like to get her flu shot today if possible)     Here for thyroid check and Synthroid refills   Is currently on 75 mcg once daily  Denies any new fatigue, dry skin, palpitations, weight loss or weight gain    Reports swelling of her right ankle for the past year  Constantly there and worsens with standing for long periods of time   States she is constantly on her feet   Wears compression stockings which help some   Pain localized with ankle, present in the morning and worsens thru out the day   States she saw a orthopedic surgeon in the past and was told she has plantar fasciitis   Reports having RLE US earlier this year at the onset of swelling, which was normal     Wanting to get a flu shot today       Review of Systems     Review of Systems   Constitutional:  Negative for chills and fever.   Respiratory:  Negative for shortness of breath.    Cardiovascular:  Negative for chest pain and palpitations.   Gastrointestinal:  Negative for abdominal pain, nausea and vomiting.   Genitourinary:  Negative for difficulty urinating.        Medical / Social / Family History     Past Medical History:   Diagnosis Date    Hyperthyroidism        Past Surgical History:   Procedure Laterality Date    HYSTERECTOMY      THYROID SURGERY         Social  History  Ms. Maggie Llamas  reports that she has never smoked. She has never used smokeless tobacco. She reports that she does not drink alcohol and does not use drugs.    Family History  Ms. Maggei Llamas's family history is not on file.    Medications and Allergies     Medications  Outpatient Medications Marked as Taking for the 11/3/23 encounter (Office Visit) with Marj Dimas, DO   Medication Sig Dispense Refill    iron-vit c-b12-folic acid (ICAR-C PLUS) Tab 1 tablet 30 tablet 0    levothyroxine (SYNTHROID) 75 MCG tablet Take 1 tablet (75 mcg total) by mouth every morning. 30 tablet 3       Allergies  Review of patient's allergies indicates:  No Known Allergies    Physical Examination     Vitals:    11/03/23 0838   BP: 119/79   Pulse: 75   Temp: 98.9 °F (37.2 °C)     Physical Exam  Constitutional:       Appearance: Normal appearance.   HENT:      Head: Normocephalic and atraumatic.      Mouth/Throat:      Mouth: Mucous membranes are moist.   Eyes:      Extraocular Movements: Extraocular movements intact.   Cardiovascular:      Rate and Rhythm: Normal rate and regular rhythm.      Pulses: Normal pulses.      Heart sounds: No murmur heard.  Pulmonary:      Effort: Pulmonary effort is normal.      Breath sounds: Normal breath sounds. No wheezing, rhonchi or rales.   Abdominal:      General: Bowel sounds are normal.      Palpations: Abdomen is soft.      Tenderness: There is no abdominal tenderness.   Musculoskeletal:      Comments: 1+ pitting edema of RLE from ankle to mid-shin. No swelling of the right foot. No erythema or tenderness    Skin:     General: Skin is warm.   Neurological:      General: No focal deficit present.      Mental Status: She is alert and oriented to person, place, and time.        Assessment and Plan (including Health Maintenance)     Plan:   Thyroid disorder  - Most recent TSH in 03/2023 WNL. Continue current dose of Synthroid     Lower extremity edema, chronic   - Chronic  dependent edema of the RLE secondary to venous stasis vs lymphedema vs pelvic outlet obstruction   - Other DDx including CHF vs pHTN secondary to sleep apnea vs renal disease   - No signs of CHF on exam. Will obtain BMP to follow up renal function. Will also obtain TSH and LFTs. Would consider ECHO as well.   - PMH negative for cancer. Will obtain records for previous RLE US.   - Continue use of compression stockings   - Follow up in 1-2 weeks    Other orders  -     Influenza - Quadrivalent *Preferred* (6 months+) (PF)    Signature:  Marj Dimas DO      Date of encounter: 11/3/23

## 2023-12-05 DIAGNOSIS — Z12.39 ENCOUNTER FOR SCREENING FOR MALIGNANT NEOPLASM OF BREAST, UNSPECIFIED SCREENING MODALITY: Primary | ICD-10-CM

## 2024-04-10 DIAGNOSIS — Z12.31 ENCOUNTER FOR SCREENING MAMMOGRAM FOR MALIGNANT NEOPLASM OF BREAST: Primary | ICD-10-CM

## 2024-05-21 ENCOUNTER — PATIENT OUTREACH (OUTPATIENT)
Dept: ADMINISTRATIVE | Facility: HOSPITAL | Age: 54
End: 2024-05-21

## 2024-06-25 ENCOUNTER — OFFICE VISIT (OUTPATIENT)
Dept: FAMILY MEDICINE | Facility: CLINIC | Age: 54
End: 2024-06-25

## 2024-06-25 VITALS
BODY MASS INDEX: 32.27 KG/M2 | OXYGEN SATURATION: 99 % | HEIGHT: 62 IN | HEART RATE: 67 BPM | TEMPERATURE: 97 F | WEIGHT: 175.38 LBS | DIASTOLIC BLOOD PRESSURE: 70 MMHG | SYSTOLIC BLOOD PRESSURE: 130 MMHG

## 2024-06-25 DIAGNOSIS — R53.81 MALAISE AND FATIGUE: ICD-10-CM

## 2024-06-25 DIAGNOSIS — Z12.11 SCREENING FOR COLORECTAL CANCER: Primary | ICD-10-CM

## 2024-06-25 DIAGNOSIS — R53.83 MALAISE AND FATIGUE: ICD-10-CM

## 2024-06-25 DIAGNOSIS — E03.9 HYPOTHYROIDISM, UNSPECIFIED TYPE: ICD-10-CM

## 2024-06-25 DIAGNOSIS — R25.1 TREMOR: Primary | ICD-10-CM

## 2024-06-25 DIAGNOSIS — Z12.12 SCREENING FOR COLORECTAL CANCER: Primary | ICD-10-CM

## 2024-06-25 LAB — TSH SERPL DL<=0.005 MIU/L-ACNC: 1.35 UIU/ML (ref 0.36–3.74)

## 2024-06-25 PROCEDURE — 99213 OFFICE O/P EST LOW 20 MIN: CPT | Mod: ,,, | Performed by: FAMILY MEDICINE

## 2024-06-25 PROCEDURE — 84443 ASSAY THYROID STIM HORMONE: CPT | Mod: ,,, | Performed by: CLINICAL MEDICAL LABORATORY

## 2024-06-25 RX ORDER — PRIMIDONE 50 MG/1
50 TABLET ORAL NIGHTLY
Qty: 90 TABLET | Refills: 1
Start: 2024-06-25 | End: 2024-06-25

## 2024-06-25 RX ORDER — PRIMIDONE 50 MG/1
50 TABLET ORAL NIGHTLY
Status: CANCELLED
Start: 2024-06-25 | End: 2024-10-23

## 2024-06-25 RX ORDER — IRON,CARB/VIT C/VIT B12/FOLIC 100-250-1
TABLET ORAL
Qty: 30 TABLET | Refills: 0 | Status: SHIPPED | OUTPATIENT
Start: 2024-06-25

## 2024-06-25 RX ORDER — PRIMIDONE 50 MG/1
50 TABLET ORAL NIGHTLY
Qty: 90 TABLET | Refills: 1 | Status: SHIPPED | OUTPATIENT
Start: 2024-06-25

## 2024-06-25 RX ORDER — LEVOTHYROXINE SODIUM 75 UG/1
75 TABLET ORAL EVERY MORNING
Qty: 90 TABLET | Refills: 2 | Status: CANCELLED | OUTPATIENT
Start: 2024-06-25

## 2024-06-25 NOTE — PROGRESS NOTES
Marj Dimas DO        PATIENT NAME: Maggie Llamas  : 1970  DATE: 24  MRN: [id number]      Reason for Visit / Chief Complaint: Tremors and Medication Refill     History of Present Illness / Problem Focused Workflow     Maggie Llamas presents to the clinic with Tremors and Medication Refill     Presents here for tremors which started years ago. Reports the tremors are of the neck and head   Per chart review and patient report, patient was started on Primidone by Neurology for these tremors   States that she had stopped taking the medication as she was feeling better but the tremors have returned   Reports she had not followed with Neurology since the initial evaluation for her tremors   Is interested in seeing her previous Neurologist, Dr. Lockhart     Does have history of hypothyroidism and is currently on Levothyroxine   Reports compliance with this medication          Review of Systems     Review of Systems   Constitutional:  Negative for chills and fever.   Respiratory:  Negative for cough and shortness of breath.    Cardiovascular:  Negative for chest pain.   Gastrointestinal:  Negative for abdominal pain, nausea and vomiting.   Genitourinary:  Negative for difficulty urinating.   Neurological:  Positive for tremors. Negative for dizziness and headaches.        Medical / Social / Family History     Past Medical History:   Diagnosis Date    Hyperthyroidism        Past Surgical History:   Procedure Laterality Date    HYSTERECTOMY      THYROID SURGERY         Social History  Ms. Maggie Llamas  reports that she has never smoked. She has never used smokeless tobacco. She reports that she does not drink alcohol and does not use drugs.    Family History  Ms. Maggie Llamas's family history is not on file.    Medications and Allergies     Medications  Outpatient Medications Marked as Taking for the 24 encounter (Office Visit) with Marj Dimas DO   Medication Sig Dispense Refill     iron-vit c-b12-folic acid (ICAR-C PLUS) Tab 1 tablet 30 tablet 0    levothyroxine (SYNTHROID) 75 MCG tablet Take 1 tablet (75 mcg total) by mouth every morning. 90 tablet 2    primidone (MYSOLINE) 50 MG Tab Take 1 tablet (50 mg total) by mouth every evening. 30 tablet 3       Allergies  Review of patient's allergies indicates:  No Known Allergies    Physical Examination     Vitals:    06/25/24 0908   BP: 130/70   Pulse: 67   Temp: 97.4 °F (36.3 °C)     Physical Exam  Constitutional:       General: She is not in acute distress.     Appearance: Normal appearance.   HENT:      Head: Normocephalic and atraumatic.   Cardiovascular:      Rate and Rhythm: Normal rate and regular rhythm.      Pulses: Normal pulses.      Heart sounds: Normal heart sounds. No murmur heard.  Pulmonary:      Effort: Pulmonary effort is normal.      Breath sounds: Normal breath sounds. No wheezing, rhonchi or rales.   Neurological:      General: No focal deficit present.      Mental Status: She is alert and oriented to person, place, and time.      Motor: No weakness.      Comments: Visible involuntary movement of head           Assessment and Plan (including Health Maintenance)     Plan:   Tremor  - Reviewed previous Neurology notes.   - Will restart Primidone 50 mg daily and refer to Neurology for reevaluation    Hypothyroidism, unspecified type  -     TSH; Future; Expected date: 06/25/2024  - Will follow up TSH results and adjust dose of Levothyroxine as indicated     Malaise and fatigue  -     Refilled: iron-vit c-b12-folic acid (ICAR-C PLUS) Tab; 1 tablet  Dispense: 30 tablet; Refill: 0    Follow up in 2-3 weeks for reevaluation     Signature:  Marj Dimas DO    Date of encounter: 6/25/24

## 2024-06-26 RX ORDER — LEVOTHYROXINE SODIUM 75 UG/1
75 TABLET ORAL EVERY MORNING
Qty: 90 TABLET | Refills: 3 | Status: SHIPPED | OUTPATIENT
Start: 2024-06-26

## 2024-10-18 ENCOUNTER — OFFICE VISIT (OUTPATIENT)
Dept: FAMILY MEDICINE | Facility: CLINIC | Age: 54
End: 2024-10-18

## 2024-10-18 VITALS
WEIGHT: 168.81 LBS | SYSTOLIC BLOOD PRESSURE: 119 MMHG | RESPIRATION RATE: 20 BRPM | OXYGEN SATURATION: 98 % | BODY MASS INDEX: 31.06 KG/M2 | TEMPERATURE: 99 F | HEART RATE: 71 BPM | HEIGHT: 62 IN | DIASTOLIC BLOOD PRESSURE: 72 MMHG

## 2024-10-18 DIAGNOSIS — R25.1 TREMOR: Primary | ICD-10-CM

## 2024-10-18 DIAGNOSIS — F41.1 GENERALIZED ANXIETY DISORDER: ICD-10-CM

## 2024-10-18 PROCEDURE — 99213 OFFICE O/P EST LOW 20 MIN: CPT | Mod: ,,, | Performed by: FAMILY MEDICINE

## 2024-10-18 RX ORDER — VENLAFAXINE HYDROCHLORIDE 37.5 MG/1
37.5 CAPSULE, EXTENDED RELEASE ORAL DAILY
Qty: 90 CAPSULE | Refills: 1 | Status: SHIPPED | OUTPATIENT
Start: 2024-10-18

## 2024-10-18 RX ORDER — PRIMIDONE 50 MG/1
50 TABLET ORAL NIGHTLY
Qty: 90 TABLET | Refills: 1 | Status: SHIPPED | OUTPATIENT
Start: 2024-10-18

## 2025-02-28 ENCOUNTER — OFFICE VISIT (OUTPATIENT)
Dept: FAMILY MEDICINE | Facility: CLINIC | Age: 55
End: 2025-02-28
Payer: COMMERCIAL

## 2025-02-28 VITALS
OXYGEN SATURATION: 98 % | TEMPERATURE: 98 F | SYSTOLIC BLOOD PRESSURE: 116 MMHG | BODY MASS INDEX: 29.19 KG/M2 | WEIGHT: 181.63 LBS | HEART RATE: 79 BPM | DIASTOLIC BLOOD PRESSURE: 74 MMHG | RESPIRATION RATE: 18 BRPM | HEIGHT: 66 IN

## 2025-02-28 DIAGNOSIS — E03.9 HYPOTHYROIDISM, UNSPECIFIED TYPE: ICD-10-CM

## 2025-02-28 DIAGNOSIS — R60.0 BILATERAL LEG EDEMA: Primary | ICD-10-CM

## 2025-02-28 LAB
ALBUMIN SERPL BCP-MCNC: 3.7 G/DL (ref 3.5–5)
ALBUMIN/GLOB SERPL: 0.9 {RATIO}
ALP SERPL-CCNC: 74 U/L (ref 40–150)
ALT SERPL W P-5'-P-CCNC: 15 U/L
ANION GAP SERPL CALCULATED.3IONS-SCNC: 10 MMOL/L (ref 7–16)
AST SERPL W P-5'-P-CCNC: 22 U/L (ref 5–34)
BASOPHILS # BLD AUTO: 0.05 K/UL (ref 0–0.2)
BASOPHILS NFR BLD AUTO: 0.8 % (ref 0–1)
BILIRUB SERPL-MCNC: 0.2 MG/DL
BILIRUB SERPL-MCNC: ABNORMAL MG/DL
BLOOD, POC UA: ABNORMAL
BUN SERPL-MCNC: 14 MG/DL (ref 10–20)
BUN/CREAT SERPL: 18 (ref 6–20)
CALCIUM SERPL-MCNC: 9 MG/DL (ref 8.4–10.2)
CHLORIDE SERPL-SCNC: 104 MMOL/L (ref 98–107)
CO2 SERPL-SCNC: 30 MMOL/L (ref 22–29)
CREAT SERPL-MCNC: 0.76 MG/DL (ref 0.55–1.02)
DIFFERENTIAL METHOD BLD: ABNORMAL
EGFR (NO RACE VARIABLE) (RUSH/TITUS): 94 ML/MIN/1.73M2
EOSINOPHIL # BLD AUTO: 0.19 K/UL (ref 0–0.5)
EOSINOPHIL NFR BLD AUTO: 2.9 % (ref 1–4)
ERYTHROCYTE [DISTWIDTH] IN BLOOD BY AUTOMATED COUNT: 12.4 % (ref 11.5–14.5)
GLOBULIN SER-MCNC: 4 G/DL (ref 2–4)
GLUCOSE SERPL-MCNC: 42 MG/DL (ref 74–100)
GLUCOSE UR QL STRIP: ABNORMAL
HCT VFR BLD AUTO: 43 % (ref 38–47)
HGB BLD-MCNC: 13.2 G/DL (ref 12–16)
IMM GRANULOCYTES # BLD AUTO: 0.01 K/UL (ref 0–0.04)
IMM GRANULOCYTES NFR BLD: 0.2 % (ref 0–0.4)
KETONES UR QL STRIP: ABNORMAL
LEUKOCYTE ESTERASE URINE, POC: ABNORMAL
LYMPHOCYTES # BLD AUTO: 2.04 K/UL (ref 1–4.8)
LYMPHOCYTES NFR BLD AUTO: 30.8 % (ref 27–41)
MCH RBC QN AUTO: 30.8 PG (ref 27–31)
MCHC RBC AUTO-ENTMCNC: 30.7 G/DL (ref 32–36)
MCV RBC AUTO: 100.2 FL (ref 80–96)
MONOCYTES # BLD AUTO: 0.5 K/UL (ref 0–0.8)
MONOCYTES NFR BLD AUTO: 7.5 % (ref 2–6)
MPC BLD CALC-MCNC: 10.3 FL (ref 9.4–12.4)
NEUTROPHILS # BLD AUTO: 3.84 K/UL (ref 1.8–7.7)
NEUTROPHILS NFR BLD AUTO: 57.8 % (ref 53–65)
NITRITE, POC UA: ABNORMAL
NRBC # BLD AUTO: 0 X10E3/UL
NRBC, AUTO (.00): 0 %
NT-PROBNP SERPL-MCNC: 12 PG/ML (ref 1–125)
PH, POC UA: 5.5
PLATELET # BLD AUTO: 237 K/UL (ref 150–400)
POTASSIUM SERPL-SCNC: 3.6 MMOL/L (ref 3.5–5.1)
PROT SERPL-MCNC: 7.7 G/DL (ref 6.4–8.3)
PROTEIN, POC: 30
RBC # BLD AUTO: 4.29 M/UL (ref 4.2–5.4)
SODIUM SERPL-SCNC: 140 MMOL/L (ref 136–145)
SPECIFIC GRAVITY, POC UA: >=1.03
TSH SERPL DL<=0.005 MIU/L-ACNC: 2.87 UIU/ML (ref 0.35–4.94)
UROBILINOGEN, POC UA: 0.2
WBC # BLD AUTO: 6.63 K/UL (ref 4.5–11)

## 2025-02-28 PROCEDURE — 83880 ASSAY OF NATRIURETIC PEPTIDE: CPT | Mod: ,,, | Performed by: CLINICAL MEDICAL LABORATORY

## 2025-02-28 PROCEDURE — 99214 OFFICE O/P EST MOD 30 MIN: CPT | Mod: ,,, | Performed by: FAMILY MEDICINE

## 2025-02-28 PROCEDURE — 81003 URINALYSIS AUTO W/O SCOPE: CPT | Mod: QW,,, | Performed by: FAMILY MEDICINE

## 2025-02-28 PROCEDURE — 80050 GENERAL HEALTH PANEL: CPT | Mod: ,,, | Performed by: CLINICAL MEDICAL LABORATORY

## 2025-02-28 RX ORDER — LEVOTHYROXINE SODIUM 75 UG/1
75 TABLET ORAL EVERY MORNING
Qty: 90 TABLET | Refills: 3 | Status: SHIPPED | OUTPATIENT
Start: 2025-02-28

## 2025-02-28 RX ORDER — IRON,CARB/VIT C/VIT B12/FOLIC 100-250-1
TABLET ORAL
Qty: 30 TABLET | Refills: 0 | Status: SHIPPED | OUTPATIENT
Start: 2025-02-28

## 2025-02-28 NOTE — PROGRESS NOTES
Marj Dimas DO        PATIENT NAME: Maggie Llamas  : 1970  DATE: 25  MRN: [id number]      Reason for Visit / Chief Complaint: Edema (To bilateral lower legs states has had this issue for years now, states lower legs and feet feel numb)     History of Present Illness / Problem Focused Workflow     Presents here for bilateral lower extremity swelling which has been there for many years   Denies any shortness of breath or chest pain.   Has tried using compression socks which have helped some   Worse at the end of the day after she has been on her feet   Denies any shortness of breath, chest pain or palpitations.     Review of Systems   Constitutional:  Negative for fever.   Respiratory:  Negative for shortness of breath.    Cardiovascular:  Negative for chest pain.   Gastrointestinal:  Negative for abdominal pain.   Musculoskeletal:  Negative for falls.   Neurological:  Negative for dizziness.       Medical / Social / Family History     Past Medical History:   Diagnosis Date    Hyperthyroidism        Past Surgical History:   Procedure Laterality Date    HYSTERECTOMY      THYROID SURGERY         Social History  Ms. Maggie Llamas  reports that she has never smoked. She has never been exposed to tobacco smoke. She has never used smokeless tobacco. She reports that she does not drink alcohol and does not use drugs.    Family History  Ms. Maggie Llamas's family history is not on file.    Medications and Allergies     Medications  Outpatient Medications Marked as Taking for the 25 encounter (Office Visit) with Marj Dimas DO   Medication Sig Dispense Refill    iron-vit c-b12-folic acid (ICAR-C PLUS) Tab 1 tablet 30 tablet 0    levothyroxine (SYNTHROID) 75 MCG tablet Take 1 tablet (75 mcg total) by mouth every morning. 90 tablet 3       Allergies  Review of patient's allergies indicates:  No Known Allergies    Physical Examination     Vitals:    25 0847   BP: 116/74    Pulse: 79   Resp: 18   Temp: 98 °F (36.7 °C)     Physical Exam  Constitutional:       General: She is not in acute distress.     Appearance: Normal appearance.   HENT:      Head: Normocephalic and atraumatic.   Cardiovascular:      Rate and Rhythm: Normal rate.   Pulmonary:      Effort: Pulmonary effort is normal.   Musculoskeletal:         General: No deformity or signs of injury.      Comments: Pitting edema in bilateral lower legs and ankles    Neurological:      General: No focal deficit present.      Mental Status: She is alert and oriented to person, place, and time.   Psychiatric:         Mood and Affect: Mood normal.         Behavior: Behavior normal.       Assessment and Plan (including Health Maintenance)     Plan:   Bilateral leg edema  DDx including venous insufficiency, nephrotic syndrome, lymphedema   Chronicity and bilateral presentation makes acute DVT less likely   Will obtain labs for further evaluation   Recommend regular use of compression socks   Prescribed Lasix as needed, per patient request.   -     TSH; Future; Expected date: 02/28/2025  -     Comprehensive Metabolic Panel; Future; Expected date: 02/28/2025  -     CBC Auto Differential; Future; Expected date: 02/28/2025  -     NT-Pro Natriuretic Peptide; Future; Expected date: 02/28/2025  -     POCT Urinalysis    Hypothyroidism, unspecified type  -     Refilled: levothyroxine (SYNTHROID) 75 MCG tablet; Take 1 tablet (75 mcg total) by mouth every morning.  Dispense: 90 tablet; Refill: 3    Other orders  -     iron-vit c-b12-folic acid (ICAR-C PLUS) Tab; 1 tablet  Dispense: 30 tablet; Refill: 0  -     furosemide (LASIX) 20 MG tablet; Take 1 tablet (20 mg total) by mouth daily as needed (LE edema).  Dispense: 30 tablet; Refill: 0      Signature:  Marj Dimas DO      Date of encounter: 2/28/25

## 2025-03-03 ENCOUNTER — RESULTS FOLLOW-UP (OUTPATIENT)
Dept: FAMILY MEDICINE | Facility: CLINIC | Age: 55
End: 2025-03-03

## 2025-03-03 ENCOUNTER — TELEPHONE (OUTPATIENT)
Dept: FAMILY MEDICINE | Facility: CLINIC | Age: 55
End: 2025-03-03
Payer: COMMERCIAL

## 2025-03-03 RX ORDER — FUROSEMIDE 20 MG/1
20 TABLET ORAL DAILY PRN
Qty: 30 TABLET | Refills: 0 | Status: SHIPPED | OUTPATIENT
Start: 2025-03-03

## 2025-03-03 NOTE — PROGRESS NOTES
Please let patient know that her labs showed very low glucose. It can occur if patient was fasting at that time of the labs. However, with it be low in the 40s. I would like to recheck it again this week. Please let patient know that she needs to make sure she is eating her meals and snacks throughout the day to avoid low sugars. Her other labs showed normal kidney and liver function as well as normal blood counts.     Dr. Dimas, DO     Dr. Dimas, DO

## 2025-03-06 DIAGNOSIS — E16.2 LOW BLOOD GLUCOSE MEASUREMENT: Primary | ICD-10-CM

## 2025-03-07 ENCOUNTER — RESULTS FOLLOW-UP (OUTPATIENT)
Dept: FAMILY MEDICINE | Facility: CLINIC | Age: 55
End: 2025-03-07

## 2025-04-02 ENCOUNTER — OFFICE VISIT (OUTPATIENT)
Dept: FAMILY MEDICINE | Facility: CLINIC | Age: 55
End: 2025-04-02
Payer: COMMERCIAL

## 2025-04-02 VITALS
BODY MASS INDEX: 29.67 KG/M2 | RESPIRATION RATE: 18 BRPM | HEART RATE: 73 BPM | DIASTOLIC BLOOD PRESSURE: 68 MMHG | SYSTOLIC BLOOD PRESSURE: 107 MMHG | OXYGEN SATURATION: 99 % | TEMPERATURE: 98 F | HEIGHT: 66 IN | WEIGHT: 184.63 LBS

## 2025-04-02 DIAGNOSIS — N94.10 DYSPAREUNIA IN FEMALE: Primary | ICD-10-CM

## 2025-04-02 LAB
BACTERIAL VAGINOSIS DNA (OHS): POSITIVE
CANDIDA GLABRATA/KRUSEI DNA (OHS): NOT DETECTED
CANDIDA SPECIES DNA (OHS): NOT DETECTED
TRICHOMONAS VAGINALIS DNA (OHS): DETECTED

## 2025-04-02 PROCEDURE — 81515 NFCT DS BV&VAGINITIS DNA ALG: CPT | Mod: QW,,, | Performed by: CLINICAL MEDICAL LABORATORY

## 2025-04-02 PROCEDURE — 88142 CYTOPATH C/V THIN LAYER: CPT | Mod: TC,GCY | Performed by: FAMILY MEDICINE

## 2025-04-02 PROCEDURE — 99213 OFFICE O/P EST LOW 20 MIN: CPT | Mod: ,,, | Performed by: FAMILY MEDICINE

## 2025-04-02 PROCEDURE — 87626 HPV SEP HI-RSK TYP&POOL RSLT: CPT | Mod: ,,, | Performed by: CLINICAL MEDICAL LABORATORY

## 2025-04-02 NOTE — PROGRESS NOTES
Marj Dimas DO        PATIENT NAME: Maggie Llamas  : 1970  DATE: 25  MRN: [id number]      Reason for Visit / Chief Complaint: Annual Exam     History of Present Illness / Problem Focused Workflow     Presents here for pap smear.     Reports history of hysterectomy. She still has the ovaries   Denies any history of abnormal pap smears.   Is wanting to have pap smear to be sure.   Reports dyspareunia and vaginal dryness     Review of Systems   Constitutional:  Negative for fever.   Respiratory:  Negative for shortness of breath.    Cardiovascular:  Negative for chest pain.   Gastrointestinal:  Negative for abdominal pain.   Genitourinary:  Negative for dysuria and frequency.   Musculoskeletal:  Negative for falls.   Neurological:  Negative for dizziness.       Medical / Social / Family History     Past Medical History:   Diagnosis Date    Hyperthyroidism        Past Surgical History:   Procedure Laterality Date    HYSTERECTOMY      THYROID SURGERY         Social History  Ms. Maggie Llamas  reports that she has never smoked. She has never been exposed to tobacco smoke. She has never used smokeless tobacco. She reports that she does not drink alcohol and does not use drugs.    Family History  Ms. Maggie Llamas's family history is not on file.    Medications and Allergies     Medications  Outpatient Medications Marked as Taking for the 25 encounter (Office Visit) with Marj Dimas DO   Medication Sig Dispense Refill    furosemide (LASIX) 20 MG tablet Take 1 tablet (20 mg total) by mouth daily as needed (LE edema). 30 tablet 0    levothyroxine (SYNTHROID) 75 MCG tablet Take 1 tablet (75 mcg total) by mouth every morning. 90 tablet 3       Allergies  Review of patient's allergies indicates:  No Known Allergies    Physical Examination     Vitals:    25 0849   BP: 107/68   Pulse: 73   Resp: 18   Temp: 98 °F (36.7 °C)     Physical Exam  Exam conducted with a chaperone present.    Constitutional:       General: She is not in acute distress.     Appearance: Normal appearance.   HENT:      Head: Normocephalic and atraumatic.   Cardiovascular:      Rate and Rhythm: Normal rate.   Pulmonary:      Effort: Pulmonary effort is normal.   Genitourinary:     Exam position: Lithotomy position.      Labia:         Right: No rash or lesion.         Left: No rash or lesion.       Vagina: No signs of injury. Vaginal discharge present. No lesions.   Neurological:      General: No focal deficit present.      Mental Status: She is alert and oriented to person, place, and time.   Psychiatric:         Mood and Affect: Mood normal.         Behavior: Behavior normal.       Assessment and Plan (including Health Maintenance)     Plan:   Dyspareunia in female  -     Ambulatory referral/consult to Gynecology; Future; Expected date: 04/09/2025  -     Vaginosis Screen by DNA Probe; Future; Expected date: 04/02/2025  -     ThinPrep Pap Test; Future; Expected date: 04/02/2025  Reports PSH of hysterectomy. Denies any history of abnormal pap smears   Patient requested pap smear today   Noted to have vaginal discharge on exam - Vag path obtained   Will refer to Gynecology for dyspareunia     Signature:  Marj Dimas DO      Date of encounter: 4/2/25

## 2025-04-04 ENCOUNTER — RESULTS FOLLOW-UP (OUTPATIENT)
Dept: FAMILY MEDICINE | Facility: CLINIC | Age: 55
End: 2025-04-04
Payer: COMMERCIAL

## 2025-04-04 RX ORDER — METRONIDAZOLE 500 MG/1
500 TABLET ORAL EVERY 12 HOURS
Qty: 14 TABLET | Refills: 0 | Status: SHIPPED | OUTPATIENT
Start: 2025-04-04 | End: 2025-04-11

## 2025-04-07 LAB
GH SERPL-MCNC: NORMAL NG/ML
INSULIN SERPL-ACNC: NORMAL U[IU]/ML
LAB AP CLINICAL INFORMATION: NORMAL
LAB AP GYN INTERPRETATION: NEGATIVE
LAB AP PAP DISCLAIMER COMMENTS: NORMAL
RENIN PLAS-CCNC: NORMAL NG/ML/H

## 2025-04-09 DIAGNOSIS — N94.10 DYSPAREUNIA IN FEMALE: Primary | ICD-10-CM

## 2025-04-10 DIAGNOSIS — Z12.11 SCREENING FOR COLON CANCER: Primary | ICD-10-CM

## 2025-04-11 LAB
HPV 16: NEGATIVE
HPV 18: NEGATIVE
HPV OTHER: NEGATIVE

## 2025-05-08 LAB — CRC RECOMMENDATION EXT: NORMAL

## 2025-05-09 ENCOUNTER — PATIENT OUTREACH (OUTPATIENT)
Facility: HOSPITAL | Age: 55
End: 2025-05-09
Payer: COMMERCIAL

## 2025-06-25 ENCOUNTER — OFFICE VISIT (OUTPATIENT)
Dept: FAMILY MEDICINE | Facility: CLINIC | Age: 55
End: 2025-06-25
Payer: COMMERCIAL

## 2025-06-25 VITALS
WEIGHT: 181.38 LBS | SYSTOLIC BLOOD PRESSURE: 105 MMHG | BODY MASS INDEX: 29.15 KG/M2 | DIASTOLIC BLOOD PRESSURE: 71 MMHG | TEMPERATURE: 98 F | OXYGEN SATURATION: 98 % | HEART RATE: 68 BPM | HEIGHT: 66 IN

## 2025-06-25 DIAGNOSIS — R05.9 COUGH, UNSPECIFIED TYPE: ICD-10-CM

## 2025-06-25 DIAGNOSIS — R06.02 SHORTNESS OF BREATH: ICD-10-CM

## 2025-06-25 DIAGNOSIS — R09.81 NASAL CONGESTION: ICD-10-CM

## 2025-06-25 DIAGNOSIS — J02.9 PHARYNGITIS, UNSPECIFIED ETIOLOGY: Primary | ICD-10-CM

## 2025-06-25 DIAGNOSIS — J02.9 SORE THROAT: ICD-10-CM

## 2025-06-25 LAB
CTP QC/QA: YES
MOLECULAR STREP A: NEGATIVE
POC MOLECULAR INFLUENZA A AGN: NEGATIVE
POC MOLECULAR INFLUENZA B AGN: NEGATIVE
SARS-COV-2 RDRP RESP QL NAA+PROBE: NEGATIVE

## 2025-06-25 RX ORDER — AMOXICILLIN 875 MG/1
875 TABLET, COATED ORAL EVERY 12 HOURS
Qty: 20 TABLET | Refills: 0 | Status: SHIPPED | OUTPATIENT
Start: 2025-06-25 | End: 2025-07-05

## 2025-06-25 NOTE — PROGRESS NOTES
MARTHA Gutierres   [facility]  [address]   [place] [address]     PATIENT NAME: Maggie Llamas  : 1970  DATE: 25  MRN: [id number]      Billing Provider: MARTHA Gutierres  Level of Service:   Patient PCP Information       Provider PCP Type    Marj Dimas,  General            Reason for Visit / Chief Complaint: Cough, Shortness of Breath, Nasal Congestion, and Sore Throat (Symptoms started about 2 weeks ago; little better this week but still lingering/)         History of Present Illness / Problem Focused Workflow     Maggie Llamas is a 55 y.o. female presents to the clinic cough, congestion, sore throat x2 weeks. She ran fever last week with max temp 101. Cough is productive. She took tylenol sinus with no relief.        Review of Systems     Review of Systems   Constitutional:  Negative for fever.   HENT:  Positive for nasal congestion and sore throat. Negative for sneezing.    Respiratory:  Positive for cough. Negative for shortness of breath and wheezing.    Cardiovascular: Negative.    Gastrointestinal: Negative.    Endocrine: Negative.       Medical / Social / Family History     Past Medical History:   Diagnosis Date    Hyperthyroidism        Past Surgical History:   Procedure Laterality Date    HYSTERECTOMY      THYROID SURGERY         Social History  MsKm  reports that she has never smoked. She has been exposed to tobacco smoke. She has never used smokeless tobacco. She reports that she does not drink alcohol and does not use drugs.    Family History  Ms.'s family history is not on file.    Medications and Allergies     Medications  Outpatient Medications Marked as Taking for the 25 encounter (Office Visit) with Maggy Henriquez FNP-C   Medication Sig Dispense Refill    levothyroxine (SYNTHROID) 75 MCG tablet Take 1 tablet (75 mcg total) by mouth every morning. 90 tablet 3    primidone (MYSOLINE) 50 MG Tab Take 1 tablet (50 mg total) by mouth  "every evening. 90 tablet 1       Allergies  Review of patient's allergies indicates:  No Known Allergies    Physical Examination     Vitals:    06/25/25 1009   BP: 105/71   Pulse: 68   Temp: 98 °F (36.7 °C)   SpO2: 98%   Weight: 82.3 kg (181 lb 6.4 oz)   Height: 5' 6" (1.676 m)      Physical Exam  Constitutional:       Appearance: She is ill-appearing.   HENT:      Head: Normocephalic and atraumatic.      Nose: Congestion present.      Mouth/Throat:      Pharynx: Oropharyngeal exudate and posterior oropharyngeal erythema present.   Cardiovascular:      Rate and Rhythm: Normal rate and regular rhythm.      Pulses: Normal pulses.      Heart sounds: Normal heart sounds.   Pulmonary:      Effort: Pulmonary effort is normal.      Breath sounds: Normal breath sounds.   Skin:     General: Skin is warm and dry.   Neurological:      Mental Status: She is alert.        Assessment and Plan (including Health Maintenance)     :    Plan:   Will treat for pharyngitis based on symptoms. Gargle with warm saltwater 3x day. Throw toothbrush away. Take tylenol for fever.      Health Maintenance Due   Topic Date Due    Hepatitis C Screening  Never done    HIV Screening  Never done    Hemoglobin A1c (Diabetic Prevention Screening)  Never done    TETANUS VACCINE  05/28/2014    Shingles Vaccine (1 of 2) Never done    Pneumococcal Vaccines (Age 50+) (1 of 1 - PCV) Never done    COVID-19 Vaccine (5 - 2024-25 season) 09/01/2024    Mammogram  05/02/2025       Problem List Items Addressed This Visit    None  Visit Diagnoses         Cough, unspecified type    -  Primary      Shortness of breath          Nasal congestion          Sore throat            .  Cough, unspecified type  -     POCT Influenza A/B Molecular  -     POCT COVID-19 Rapid Screening    Shortness of breath  -     POCT COVID-19 Rapid Screening    Nasal congestion  -     POCT Influenza A/B Molecular  -     POCT COVID-19 Rapid Screening    Sore throat  -     POCT Strep A, " Molecular       Health Maintenance Topics with due status: Not Due       Topic Last Completion Date    Lipid Panel 05/13/2022    Colorectal Cancer Screening 05/08/2025    RSV Vaccine (Age 60+ and Pregnant patients) Not Due       Procedures     No future appointments.     No follow-ups on file.     Signature:  MARTHA Gutierres    Date of encounter: 6/25/25

## 2025-07-24 ENCOUNTER — OFFICE VISIT (OUTPATIENT)
Dept: FAMILY MEDICINE | Facility: CLINIC | Age: 55
End: 2025-07-24
Payer: COMMERCIAL

## 2025-07-24 VITALS
HEART RATE: 61 BPM | WEIGHT: 178.81 LBS | TEMPERATURE: 98 F | RESPIRATION RATE: 20 BRPM | OXYGEN SATURATION: 100 % | DIASTOLIC BLOOD PRESSURE: 77 MMHG | BODY MASS INDEX: 28.74 KG/M2 | HEIGHT: 66 IN | SYSTOLIC BLOOD PRESSURE: 126 MMHG

## 2025-07-24 DIAGNOSIS — M25.512 ACUTE PAIN OF LEFT SHOULDER: ICD-10-CM

## 2025-07-24 DIAGNOSIS — Z11.59 NEED FOR HEPATITIS C SCREENING TEST: ICD-10-CM

## 2025-07-24 DIAGNOSIS — M54.9 BACK PAIN, UNSPECIFIED BACK LOCATION, UNSPECIFIED BACK PAIN LATERALITY, UNSPECIFIED CHRONICITY: Primary | ICD-10-CM

## 2025-07-24 DIAGNOSIS — N30.90 CYSTITIS: ICD-10-CM

## 2025-07-24 DIAGNOSIS — Z11.4 ENCOUNTER FOR SCREENING FOR HIV: ICD-10-CM

## 2025-07-24 DIAGNOSIS — G25.0 ESSENTIAL TREMOR: ICD-10-CM

## 2025-07-24 DIAGNOSIS — Z13.1 DIABETES MELLITUS SCREENING: ICD-10-CM

## 2025-07-24 LAB
ANION GAP SERPL CALCULATED.3IONS-SCNC: 10 MMOL/L (ref 7–16)
BASOPHILS # BLD AUTO: 0.05 K/UL (ref 0–0.2)
BASOPHILS NFR BLD AUTO: 0.7 % (ref 0–1)
BILIRUB SERPL-MCNC: ABNORMAL MG/DL
BLOOD URINE, POC: ABNORMAL
BUN SERPL-MCNC: 12 MG/DL (ref 10–20)
BUN/CREAT SERPL: 16 (ref 6–20)
CALCIUM SERPL-MCNC: 8.6 MG/DL (ref 8.4–10.2)
CHLORIDE SERPL-SCNC: 106 MMOL/L (ref 98–107)
CLARITY, UA: ABNORMAL
CO2 SERPL-SCNC: 27 MMOL/L (ref 22–29)
COLOR, UA: YELLOW
CREAT SERPL-MCNC: 0.76 MG/DL (ref 0.55–1.02)
DIFFERENTIAL METHOD BLD: ABNORMAL
EGFR (NO RACE VARIABLE) (RUSH/TITUS): 94 ML/MIN/1.73M2
EOSINOPHIL # BLD AUTO: 0.13 K/UL (ref 0–0.5)
EOSINOPHIL NFR BLD AUTO: 1.8 % (ref 1–4)
ERYTHROCYTE [DISTWIDTH] IN BLOOD BY AUTOMATED COUNT: 12.4 % (ref 11.5–14.5)
EST. AVERAGE GLUCOSE BLD GHB EST-MCNC: 94 MG/DL
GLUCOSE SERPL-MCNC: 79 MG/DL (ref 74–100)
GLUCOSE UR QL STRIP: ABNORMAL
HBA1C MFR BLD HPLC: 4.9 %
HCT VFR BLD AUTO: 42 % (ref 38–47)
HGB BLD-MCNC: 13.5 G/DL (ref 12–16)
IMM GRANULOCYTES # BLD AUTO: 0.02 K/UL (ref 0–0.04)
IMM GRANULOCYTES NFR BLD: 0.3 % (ref 0–0.4)
KETONES UR QL STRIP: ABNORMAL
LEUKOCYTE ESTERASE URINE, POC: ABNORMAL
LYMPHOCYTES # BLD AUTO: 2.32 K/UL (ref 1–4.8)
LYMPHOCYTES NFR BLD AUTO: 32.6 % (ref 27–41)
MCH RBC QN AUTO: 30.8 PG (ref 27–31)
MCHC RBC AUTO-ENTMCNC: 32.1 G/DL (ref 32–36)
MCV RBC AUTO: 95.9 FL (ref 80–96)
MONOCYTES # BLD AUTO: 0.46 K/UL (ref 0–0.8)
MONOCYTES NFR BLD AUTO: 6.5 % (ref 2–6)
MPC BLD CALC-MCNC: 10.6 FL (ref 9.4–12.4)
NEUTROPHILS # BLD AUTO: 4.13 K/UL (ref 1.8–7.7)
NEUTROPHILS NFR BLD AUTO: 58.1 % (ref 53–65)
NITRITE, POC UA: ABNORMAL
NRBC # BLD AUTO: 0 X10E3/UL
NRBC, AUTO (.00): 0 %
PH, POC UA: 7
PLATELET # BLD AUTO: 251 K/UL (ref 150–400)
POTASSIUM SERPL-SCNC: 3.7 MMOL/L (ref 3.5–5.1)
PROTEIN, POC: ABNORMAL
RBC # BLD AUTO: 4.38 M/UL (ref 4.2–5.4)
SODIUM SERPL-SCNC: 139 MMOL/L (ref 136–145)
SPECIFIC GRAVITY, POC UA: 1.02
UROBILINOGEN, POC UA: 0.2
WBC # BLD AUTO: 7.11 K/UL (ref 4.5–11)

## 2025-07-24 PROCEDURE — 85025 COMPLETE CBC W/AUTO DIFF WBC: CPT | Mod: ,,, | Performed by: CLINICAL MEDICAL LABORATORY

## 2025-07-24 PROCEDURE — 99214 OFFICE O/P EST MOD 30 MIN: CPT | Mod: ,,, | Performed by: NURSE PRACTITIONER

## 2025-07-24 PROCEDURE — 83036 HEMOGLOBIN GLYCOSYLATED A1C: CPT | Mod: ,,, | Performed by: CLINICAL MEDICAL LABORATORY

## 2025-07-24 PROCEDURE — 80048 BASIC METABOLIC PNL TOTAL CA: CPT | Mod: ,,, | Performed by: CLINICAL MEDICAL LABORATORY

## 2025-07-24 RX ORDER — GABAPENTIN 300 MG/1
300 CAPSULE ORAL 3 TIMES DAILY
COMMUNITY
Start: 2025-07-08 | End: 2026-07-08

## 2025-07-24 RX ORDER — METHYLPREDNISOLONE 4 MG/1
TABLET ORAL
Qty: 21 EACH | Refills: 0 | Status: SHIPPED | OUTPATIENT
Start: 2025-07-24

## 2025-07-24 RX ORDER — FLUCONAZOLE 150 MG/1
150 TABLET ORAL DAILY
Qty: 1 TABLET | Refills: 0 | Status: SHIPPED | OUTPATIENT
Start: 2025-07-24 | End: 2025-07-25

## 2025-07-24 RX ORDER — CYCLOBENZAPRINE HCL 10 MG
10 TABLET ORAL NIGHTLY
Qty: 10 TABLET | Refills: 0 | Status: SHIPPED | OUTPATIENT
Start: 2025-07-24 | End: 2025-08-03

## 2025-07-24 RX ORDER — SULFAMETHOXAZOLE AND TRIMETHOPRIM 800; 160 MG/1; MG/1
1 TABLET ORAL 2 TIMES DAILY
Qty: 10 TABLET | Refills: 0 | Status: SHIPPED | OUTPATIENT
Start: 2025-07-24 | End: 2025-07-29

## 2025-07-24 NOTE — LETTER
July 24, 2025    Maggie Llamas  [address]  [place] [address]             [facility]  Family Medicine  [facility] [address]  [place] [address]  Phone: [phone]  Fax: [phone]   July 24, 2025     Patient: Maggie Llamas   YOB: 1970   Date of Visit: 7/24/2025       To Whom it May Concern:    Maggie Llamas was seen in my clinic on 7/24/2025. She may return to work on 7/26/2025.    Please excuse her from any  work missed.    If you have any questions or concerns, please don't hesitate to call.    Sincerely,         Nagi Monk, ANGEC

## 2025-07-24 NOTE — PATIENT INSTRUCTIONS
Try this home care advice:  When in bed, try to find a position of comfort. A firm mattress is best. Try lying flat on your back with pillows under your knees. You can also try lying on your side with your knees bent up toward your chest and a pillow between your knees.  At first, don't try to stretch out the sore spots. If there is a strain, it's not like the good soreness you get after exercising without an injury. In this case, stretching may make it worse.  Don't sit for long periods, as in a long car ride or during other travel. This puts more stress on the lower back than standing or walking.  During the first 24 to 72 hours after an acute injury or flare up of chronic back pain, apply an ice pack to the painful area for 20 minutes and then remove it for 20 minutes. Do this over a period of 60 to 90 minutes or several times a day. This will reduce swelling and pain. Wrap the ice pack in a thin towel or plastic to protect your skin.  You can start with ice, then switch to heat. Heat (hot shower, hot bath, or heating pad) reduces pain and works well for muscle spasms. Heat can be applied to the painful area for 20 minutes then remove it for 20 minutes. Do this over a period of 60 to 90 minutes or several times a day. Don't sleep on a heating pad. It can lead to skin burns or tissue damage.  You can alternate ice and heat therapy. Talk with your doctor about the best treatment for your back pain.  Therapeutic massage can help relax the back muscles without stretching them.  Be aware of safe lifting methods. Don't lift anything without stretching first.      Check to see if you have that prescription from neurologist for tremors

## 2025-07-24 NOTE — PROGRESS NOTES
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 But is not sure if its is from lifting patients or from a uti ) and Health Maintenance (Hepatitis C Screening Never done/HIV Screening Never done/Hemoglobin A1c (Diabetic Prevention Screening) Never done/TETANUS VACCINE due on 2014/Shingles Vaccine(1 of 2) Never done/Pneumococcal Vaccines (Age 50+)(1 of 1 - PCV) Never done/COVID-19 Vaccine( season) due on 2024/Mammogram due on )     Back Pain  Pertinent negatives include no abdominal pain, chest pain, fever or headaches.     Complaint of acute on chronic lower back pain and white vaginal dc.She also has pain and tenderness to top of left shoulder. Noted to have visible tremors to head and voice  "when speaking. States saw neurologist last week. May have been prescribed propranolol she think, but did not pick it up.     Review of Systems     Review of Systems   Constitutional:  Negative for chills and fever.   Respiratory:  Negative for cough and shortness of breath.    Cardiovascular:  Negative for chest pain.   Gastrointestinal:  Negative for abdominal pain, nausea and vomiting.   Genitourinary:  Negative for difficulty urinating.   Musculoskeletal:  Positive for back pain.   Neurological:  Positive for tremors. Negative for dizziness and headaches.          Medical / Social / Family History     Past Medical History:   Diagnosis Date    Hyperthyroidism        Past Surgical History:   Procedure Laterality Date    HYSTERECTOMY      THYROID SURGERY         Social History  Ms.  reports that she has never smoked. She has been exposed to tobacco smoke. She has never used smokeless tobacco. She reports that she does not drink alcohol and does not use drugs.    Family History  Ms.'s family history is not on file.    Medications and Allergies     Medications  Outpatient Medications Marked as Taking for the 7/24/25 encounter (Office Visit) with Nagi Monk FNP-C   Medication Sig Dispense Refill    gabapentin (NEURONTIN) 300 MG capsule Take 300 mg by mouth 3 (three) times daily.      levothyroxine (SYNTHROID) 75 MCG tablet Take 1 tablet (75 mcg total) by mouth every morning. 90 tablet 3       Allergies  Review of patient's allergies indicates:  No Known Allergies    Physical Examination   /77 (BP Location: Left arm, Patient Position: Sitting)   Pulse 61   Temp 97.8 °F (36.6 °C) (Oral)   Resp 20   Ht 5' 6" (1.676 m)   Wt 81.1 kg (178 lb 12.8 oz)   SpO2 100%   BMI 28.86 kg/m²    Physical Exam  Constitutional:       General: She is not in acute distress.     Appearance: Normal appearance.   HENT:      Head: Normocephalic and atraumatic.   Cardiovascular:      Rate and Rhythm: Normal rate. "   Pulmonary:      Effort: Pulmonary effort is normal.   Musculoskeletal:         General: Tenderness (to left upper shoulder and bilateral lower back) present.   Skin:     General: Skin is warm and dry.      Capillary Refill: Capillary refill takes less than 2 seconds.   Neurological:      General: No focal deficit present.      Mental Status: She is alert and oriented to person, place, and time.   Psychiatric:         Mood and Affect: Mood normal.         Behavior: Behavior normal.          Assessment and Plan (including Health Maintenance)      Problem List  Smart Sets  Document Outside HM   :    Plan: Will treat for UTI and Musculoskeletal pain. Patient will check with pharmacy if they have the new medication from her neurologist for tremors. Drawn basic labs and Care gap testing. Refused HIV and Hep C.        Health Maintenance Due   Topic Date Due    Hepatitis C Screening  Never done    HIV Screening  Never done    Hemoglobin A1c (Diabetic Prevention Screening)  Never done    TETANUS VACCINE  05/28/2014    Shingles Vaccine (1 of 2) Never done    Pneumococcal Vaccines (Age 50+) (1 of 1 - PCV) Never done    COVID-19 Vaccine (5 - 2024-25 season) 09/01/2024    Mammogram  05/02/2025       Problem List Items Addressed This Visit       Essential tremor     Other Visit Diagnoses         Back pain, unspecified back location, unspecified back pain laterality, unspecified chronicity    -  Primary    Relevant Orders    POCT URINALYSIS W/O SCOPE (Completed)    CBC Auto Differential    Basic Metabolic Panel      Acute pain of left shoulder          Cystitis          Need for hepatitis C screening test        Relevant Orders    Hepatitis C Antibody      Encounter for screening for HIV        Relevant Orders    HIV 1/2 Ag/Ab (4th Gen)      Diabetes mellitus screening        Relevant Orders    Hemoglobin A1C            Health Maintenance Topics with due status: Not Due       Topic Last Completion Date    Lipid Panel 05/13/2022     Influenza Vaccine 02/28/2025    Colorectal Cancer Screening 05/08/2025    RSV Vaccine (Age 60+ and Pregnant patients) Not Due       No future appointments.         Signature:  EB Cox-C      [address]             [place] [address]    Date of encounter: 7/24/25

## 2025-08-07 ENCOUNTER — TELEPHONE (OUTPATIENT)
Dept: FAMILY MEDICINE | Facility: CLINIC | Age: 55
End: 2025-08-07
Payer: COMMERCIAL